# Patient Record
Sex: FEMALE | Race: WHITE | NOT HISPANIC OR LATINO | URBAN - METROPOLITAN AREA
[De-identification: names, ages, dates, MRNs, and addresses within clinical notes are randomized per-mention and may not be internally consistent; named-entity substitution may affect disease eponyms.]

---

## 2023-09-08 ENCOUNTER — INPATIENT (INPATIENT)
Facility: HOSPITAL | Age: 78
LOS: 2 days | Discharge: HOME HEALTH | DRG: 309 | End: 2023-09-11
Attending: HOSPITALIST | Admitting: INTERNAL MEDICINE
Payer: MEDICARE

## 2023-09-08 VITALS
TEMPERATURE: 99 F | DIASTOLIC BLOOD PRESSURE: 68 MMHG | OXYGEN SATURATION: 98 % | RESPIRATION RATE: 29 BRPM | SYSTOLIC BLOOD PRESSURE: 105 MMHG | HEART RATE: 163 BPM

## 2023-09-08 DIAGNOSIS — I48.92 UNSPECIFIED ATRIAL FLUTTER: ICD-10-CM

## 2023-09-08 LAB
ALBUMIN SERPL ELPH-MCNC: 2.7 G/DL — LOW (ref 3.3–5)
ALP SERPL-CCNC: 146 U/L — HIGH (ref 40–120)
ALT FLD-CCNC: 17 U/L — SIGNIFICANT CHANGE UP (ref 12–78)
ANION GAP SERPL CALC-SCNC: 4 MMOL/L — LOW (ref 5–17)
ANISOCYTOSIS BLD QL: SIGNIFICANT CHANGE UP
APTT BLD: 26.3 SEC — SIGNIFICANT CHANGE UP (ref 24.5–35.6)
AST SERPL-CCNC: 14 U/L — LOW (ref 15–37)
BASOPHILS # BLD AUTO: 0 K/UL — SIGNIFICANT CHANGE UP (ref 0–0.2)
BASOPHILS NFR BLD AUTO: 0 % — SIGNIFICANT CHANGE UP (ref 0–2)
BILIRUB SERPL-MCNC: 0.5 MG/DL — SIGNIFICANT CHANGE UP (ref 0.2–1.2)
BUN SERPL-MCNC: 15 MG/DL — SIGNIFICANT CHANGE UP (ref 7–23)
CALCIUM SERPL-MCNC: 8.3 MG/DL — LOW (ref 8.5–10.1)
CHLORIDE SERPL-SCNC: 110 MMOL/L — HIGH (ref 96–108)
CO2 SERPL-SCNC: 27 MMOL/L — SIGNIFICANT CHANGE UP (ref 22–31)
CREAT SERPL-MCNC: 0.59 MG/DL — SIGNIFICANT CHANGE UP (ref 0.5–1.3)
DACRYOCYTES BLD QL SMEAR: SLIGHT — SIGNIFICANT CHANGE UP
EGFR: 92 ML/MIN/1.73M2 — SIGNIFICANT CHANGE UP
EOSINOPHIL # BLD AUTO: 0.04 K/UL — SIGNIFICANT CHANGE UP (ref 0–0.5)
EOSINOPHIL NFR BLD AUTO: 1 % — SIGNIFICANT CHANGE UP (ref 0–6)
GLUCOSE SERPL-MCNC: 125 MG/DL — HIGH (ref 70–99)
HCT VFR BLD CALC: 29 % — LOW (ref 34.5–45)
HGB BLD-MCNC: 9.7 G/DL — LOW (ref 11.5–15.5)
HYPOCHROMIA BLD QL: SIGNIFICANT CHANGE UP
INR BLD: 1.02 RATIO — SIGNIFICANT CHANGE UP (ref 0.85–1.18)
LYMPHOCYTES # BLD AUTO: 0.79 K/UL — LOW (ref 1–3.3)
LYMPHOCYTES # BLD AUTO: 19 % — SIGNIFICANT CHANGE UP (ref 13–44)
MAGNESIUM SERPL-MCNC: 2.1 MG/DL — SIGNIFICANT CHANGE UP (ref 1.6–2.6)
MANUAL SMEAR VERIFICATION: SIGNIFICANT CHANGE UP
MCHC RBC-ENTMCNC: 29.8 PG — SIGNIFICANT CHANGE UP (ref 27–34)
MCHC RBC-ENTMCNC: 33.4 GM/DL — SIGNIFICANT CHANGE UP (ref 32–36)
MCV RBC AUTO: 89 FL — SIGNIFICANT CHANGE UP (ref 80–100)
METAMYELOCYTES # FLD: 3 % — HIGH (ref 0–0)
MONOCYTES # BLD AUTO: 0.33 K/UL — SIGNIFICANT CHANGE UP (ref 0–0.9)
MONOCYTES NFR BLD AUTO: 8 % — SIGNIFICANT CHANGE UP (ref 2–14)
NEUTROPHILS # BLD AUTO: 2.76 K/UL — SIGNIFICANT CHANGE UP (ref 1.8–7.4)
NEUTROPHILS NFR BLD AUTO: 62 % — SIGNIFICANT CHANGE UP (ref 43–77)
NEUTS BAND # BLD: 4 % — SIGNIFICANT CHANGE UP (ref 0–8)
NRBC # BLD: 1 /100 — HIGH (ref 0–0)
NRBC # BLD: SIGNIFICANT CHANGE UP /100 WBCS (ref 0–0)
NT-PROBNP SERPL-SCNC: 2510 PG/ML — HIGH (ref 0–450)
OVALOCYTES BLD QL SMEAR: SLIGHT — SIGNIFICANT CHANGE UP
PLAT MORPH BLD: NORMAL — SIGNIFICANT CHANGE UP
PLATELET # BLD AUTO: 93 K/UL — LOW (ref 150–400)
POIKILOCYTOSIS BLD QL AUTO: SLIGHT — SIGNIFICANT CHANGE UP
POTASSIUM SERPL-MCNC: 3.5 MMOL/L — SIGNIFICANT CHANGE UP (ref 3.5–5.3)
POTASSIUM SERPL-SCNC: 3.5 MMOL/L — SIGNIFICANT CHANGE UP (ref 3.5–5.3)
PROT SERPL-MCNC: 6.1 GM/DL — SIGNIFICANT CHANGE UP (ref 6–8.3)
PROTHROM AB SERPL-ACNC: 11.5 SEC — SIGNIFICANT CHANGE UP (ref 9.5–13)
RBC # BLD: 3.26 M/UL — LOW (ref 3.8–5.2)
RBC # FLD: 24.2 % — HIGH (ref 10.3–14.5)
RBC BLD AUTO: ABNORMAL
SCHISTOCYTES BLD QL AUTO: SLIGHT — SIGNIFICANT CHANGE UP
SODIUM SERPL-SCNC: 141 MMOL/L — SIGNIFICANT CHANGE UP (ref 135–145)
T4 FREE SERPL-MCNC: 1.13 NG/DL — SIGNIFICANT CHANGE UP (ref 0.76–1.46)
TARGETS BLD QL SMEAR: SLIGHT — SIGNIFICANT CHANGE UP
TROPONIN I, HIGH SENSITIVITY RESULT: 17.37 NG/L — SIGNIFICANT CHANGE UP
TSH SERPL-MCNC: 0.12 UU/ML — LOW (ref 0.34–4.82)
TSH SERPL-MCNC: 0.13 UU/ML — LOW (ref 0.34–4.82)
VARIANT LYMPHS # BLD: 3 % — SIGNIFICANT CHANGE UP (ref 0–6)
WBC # BLD: 4.18 K/UL — SIGNIFICANT CHANGE UP (ref 3.8–10.5)
WBC # FLD AUTO: 4.18 K/UL — SIGNIFICANT CHANGE UP (ref 3.8–10.5)

## 2023-09-08 PROCEDURE — 80048 BASIC METABOLIC PNL TOTAL CA: CPT

## 2023-09-08 PROCEDURE — 84100 ASSAY OF PHOSPHORUS: CPT

## 2023-09-08 PROCEDURE — 84436 ASSAY OF TOTAL THYROXINE: CPT

## 2023-09-08 PROCEDURE — 71045 X-RAY EXAM CHEST 1 VIEW: CPT | Mod: 26

## 2023-09-08 PROCEDURE — 83735 ASSAY OF MAGNESIUM: CPT

## 2023-09-08 PROCEDURE — 84439 ASSAY OF FREE THYROXINE: CPT

## 2023-09-08 PROCEDURE — 84443 ASSAY THYROID STIM HORMONE: CPT

## 2023-09-08 PROCEDURE — 99291 CRITICAL CARE FIRST HOUR: CPT

## 2023-09-08 PROCEDURE — 85027 COMPLETE CBC AUTOMATED: CPT

## 2023-09-08 PROCEDURE — 83036 HEMOGLOBIN GLYCOSYLATED A1C: CPT

## 2023-09-08 PROCEDURE — 36415 COLL VENOUS BLD VENIPUNCTURE: CPT

## 2023-09-08 PROCEDURE — 93010 ELECTROCARDIOGRAM REPORT: CPT

## 2023-09-08 PROCEDURE — 93306 TTE W/DOPPLER COMPLETE: CPT

## 2023-09-08 PROCEDURE — 84484 ASSAY OF TROPONIN QUANT: CPT

## 2023-09-08 PROCEDURE — 84480 ASSAY TRIIODOTHYRONINE (T3): CPT

## 2023-09-08 RX ORDER — DILTIAZEM HCL 120 MG
30 CAPSULE, EXT RELEASE 24 HR ORAL ONCE
Refills: 0 | Status: COMPLETED | OUTPATIENT
Start: 2023-09-08 | End: 2023-09-08

## 2023-09-08 RX ORDER — DILTIAZEM HCL 120 MG
30 CAPSULE, EXT RELEASE 24 HR ORAL EVERY 6 HOURS
Refills: 0 | Status: DISCONTINUED | OUTPATIENT
Start: 2023-09-08 | End: 2023-09-09

## 2023-09-08 RX ORDER — ENOXAPARIN SODIUM 100 MG/ML
60 INJECTION SUBCUTANEOUS ONCE
Refills: 0 | Status: COMPLETED | OUTPATIENT
Start: 2023-09-08 | End: 2023-09-09

## 2023-09-08 RX ORDER — DILTIAZEM HCL 120 MG
10 CAPSULE, EXT RELEASE 24 HR ORAL ONCE
Refills: 0 | Status: COMPLETED | OUTPATIENT
Start: 2023-09-08 | End: 2023-09-08

## 2023-09-08 RX ORDER — SODIUM CHLORIDE 9 MG/ML
500 INJECTION INTRAMUSCULAR; INTRAVENOUS; SUBCUTANEOUS ONCE
Refills: 0 | Status: COMPLETED | OUTPATIENT
Start: 2023-09-08 | End: 2023-09-08

## 2023-09-08 RX ORDER — DIGOXIN 250 MCG
125 TABLET ORAL DAILY
Refills: 0 | Status: DISCONTINUED | OUTPATIENT
Start: 2023-09-08 | End: 2023-09-11

## 2023-09-08 RX ORDER — ACETAMINOPHEN 500 MG
650 TABLET ORAL ONCE
Refills: 0 | Status: DISCONTINUED | OUTPATIENT
Start: 2023-09-08 | End: 2023-09-11

## 2023-09-08 RX ORDER — DIGOXIN 250 MCG
250 TABLET ORAL ONCE
Refills: 0 | Status: COMPLETED | OUTPATIENT
Start: 2023-09-08 | End: 2023-09-08

## 2023-09-08 RX ADMIN — Medication 30 MILLIGRAM(S): at 20:59

## 2023-09-08 RX ADMIN — SODIUM CHLORIDE 500 MILLILITER(S): 9 INJECTION INTRAMUSCULAR; INTRAVENOUS; SUBCUTANEOUS at 14:45

## 2023-09-08 RX ADMIN — Medication 30 MILLIGRAM(S): at 14:54

## 2023-09-08 RX ADMIN — Medication 10 MILLIGRAM(S): at 14:45

## 2023-09-08 RX ADMIN — Medication 10 MILLIGRAM(S): at 21:14

## 2023-09-08 RX ADMIN — Medication 250 MICROGRAM(S): at 15:09

## 2023-09-08 NOTE — ED PROVIDER NOTE - CLINICAL SUMMARY MEDICAL DECISION MAKING FREE TEXT BOX
76 y/o female with PMHx of lung cancer mets to brain, though no longer, last chemo 8/30, BIB private car via son from MD office after found to have +tachycardia at routine oncology appointment. Cardiologist office eval Dr. Essie ford. Patient denies chest pain, SOB, +general weakness, fatigue since last chemo. No fever. Cardiac monitor and +NCT.   Plan: EKG, CXR, labs including troponin, BNP, TSH, coags. Monitor, observe, reassess.   EKG +NCT ?SVT, but cardiac monitor reveals +rapid aflutter. Patient hypotensive 89, administering IV fluid prior to Cardizem consideration. Discussed with Essie cardiology.  Current blood pressure 89 systolic with normal neuro. Heart rate 145, rapid aflutter. Case discussed with Dr. Fountain, who advises 0.25mg Digoxin now. Agrees with IV fluid to better stabilize blood pressure. If systolic over 100, agrees with IV Cardizem; if not, another 0.25mg IV Digoxin in an hour. Dr Fountain will come in to see patient this afternoon. Expect admission. 76 y/o female with PMHx of lung cancer mets to brain, though no longer, last chemo 8/30, BIB private car via son from MD office after found to have +tachycardia at routine oncology appointment. Cardiologist office eval Dr. Fountain: rapid AFlutter. Patient denies chest pain, SOB, +general weakness, fatigue since last chemo. No fever. Cardiac monitor and +NCT.   Plan: EKG, CXR, labs including troponin, BNP, TSH, coags. Monitor, observe, reassess.   EKG +NCT ?SVT, but cardiac monitor reveals +rapid AFlutter. Patient hypotensive 89, administering IV fluid prior to Cardizem consideration. Discussed with Dr. Fountain Cardiology.  Current blood pressure 89 systolic with normal neuro. Heart rate 145, rapid AFlutter. Case discussed with Dr. Fountain, who advises 0.25mg Digoxin now. Agrees with IV fluid to better stabilize blood pressure. If systolic over 100, agrees with IV Cardizem; if not, another 0.25mg IV Digoxin in an hour. Dr Fountain will come in to see patient this afternoon. Expect admission.    See Progress Noters for labs review, pt re-eval & disposition.

## 2023-09-08 NOTE — ED PROVIDER NOTE - CONSTITUTIONAL, MLM
normal... Elderly white female, no respiratory distress, nontoxic. Elderly white female, no respiratory distress, mildly ill-appearing but non-toxic.

## 2023-09-08 NOTE — ED PROVIDER NOTE - PROGRESS NOTE DETAILS
Vineet Iverson for Dr. Alfonso: Current blood pressure 89 systolic with normal neuro. Heart rate 145, rapid aflutter. Case discussed with Dr. Fountain, who advises 0.25mg now. Agrees with IV fluid to better stabilize blood pressure. If systolic over 100, agrees with IV Cardizem; if not, another 0.25mg didge IV in an hour. Dr Fountain will come in to see patient this afternoon. Expect admission. KIP Alfonso MD:  Dr. Gregorio aware of Tele admission.   - 115, pt w/o cp, SOB, BP stable. KIP Alfonso MD:  Admit hospitalist called for Tele admission, phone message left.  Labs notable for normal Troponin, BNP ~ 2500 with small R pl. effusion by wet read. Vineet Iverson for Dr. Alfonso: Current blood pressure 89 systolic with normal neuro. Heart rate 145, rapid AFlutter. Case discussed with Dr. Fountain, who advises IV Digoxin 0.25mg now. Agrees with IV fluid to better stabilize blood pressure. If systolic over 100, agrees with IV Cardizem; if not, another 0.25mg Digoxin IV in an hour. Dr Fountain will come in to see patient this afternoon. Expect admission. KIP Alfonso MD:  Dr. Gregorio aware of Tele admission.   - 115 s/p Cardizem IV/po & IV Digoxin, pt w/o cp, SOB, BP stable.

## 2023-09-08 NOTE — ED ADULT NURSE NOTE - NSICDXPASTMEDICALHX_GEN_ALL_CORE_FT
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

## 2023-09-08 NOTE — CONSULT NOTE ADULT - ASSESSMENT
Atrial Flutter with RVR  Now cardioverted to NSR with dilt and Dig. Already on lopressor.  Alec Vasc score elevated.    Plan : Start AC with Eliquis (2.5 MG BID)  Continue digoxin 0.125 MG QD  Start Diltazem 30 MG Q 6 h and Change over o 120 CD daily.  Continue Lopressor as does have hyperthyroidism  Echo to r/o pericardial effusion  Repeat TFT's  Consult Oncology for fu.    D/w Staff, patient and family and Dr. Alfonso in ER.

## 2023-09-08 NOTE — ED PROVIDER NOTE - OBJECTIVE STATEMENT
76 y/o female with PMHx of non-small cell lung cancer metastatic to brain, last chemotherapy treatment 8/30, presents to the ED SIB Dr. Fountain for aflutter to 170s at [facility] office. Denies nausea/vomiting, abdominal pain, fever, cough, SOB, chest pain, palpitations, lightheadedness. 76 y/o female with PMHx of non-small cell lung cancer metastatic to brain, last chemotherapy treatment 8/30, BIB pvt car to the ED from Cardiology office/ Dr. Fountain for rapid AFlutter to 170s. Denies nausea/vomiting, abdominal pain, fever, cough, SOB, chest pain, palpitations, lightheadedness.  No h/o AFib nor AFlutter.

## 2023-09-08 NOTE — ED PROVIDER NOTE - NEUROLOGICAL, MLM
Spontaneous, unlabored and symmetrical Alert and oriented x3, cranial nerves intact, normal speech. no focal deficits, no motor or sensory deficits.

## 2023-09-08 NOTE — ED PROVIDER NOTE - CARE PLAN
1 Principal Discharge DX:	Atrial flutter with rapid ventricular response  Secondary Diagnosis:	New onset atrial flutter  Secondary Diagnosis:	Lung cancer

## 2023-09-08 NOTE — ED ADULT TRIAGE NOTE - CHIEF COMPLAINT QUOTE
Pt presents to ED sent by MD Fountain for rapid a flutter with . Pt continues to have HR in 170s in triage. Denies palpitations, chest pain, shortness of breath at this time. Sent to trauma .

## 2023-09-08 NOTE — ED ADULT NURSE NOTE - OBJECTIVE STATEMENT
PT presents to ED sent from cardiologist for SVT in 160s. PT was at follow up appointment when tachy was discovered without symptoms. PT has no complaints, expresses strong desire to go home due to lack of symptoms. PT has hx of brain and lung cancer, last chemo treatment on 8/30. PT skin color appropriate for race, respirations even and unlabored. ED workup in progress, medicated as per orders. PT under close observation in trauma room. Medicated as per orders. Will continue to monitor. Antione Castaneda RN

## 2023-09-09 LAB
A1C WITH ESTIMATED AVERAGE GLUCOSE RESULT: 5.7 % — HIGH (ref 4–5.6)
ADD ON TEST-SPECIMEN IN LAB: SIGNIFICANT CHANGE UP
ANION GAP SERPL CALC-SCNC: 6 MMOL/L — SIGNIFICANT CHANGE UP (ref 5–17)
BUN SERPL-MCNC: 11 MG/DL — SIGNIFICANT CHANGE UP (ref 7–23)
CALCIUM SERPL-MCNC: 8.3 MG/DL — LOW (ref 8.5–10.1)
CHLORIDE SERPL-SCNC: 114 MMOL/L — HIGH (ref 96–108)
CO2 SERPL-SCNC: 25 MMOL/L — SIGNIFICANT CHANGE UP (ref 22–31)
CREAT SERPL-MCNC: 0.67 MG/DL — SIGNIFICANT CHANGE UP (ref 0.5–1.3)
EGFR: 89 ML/MIN/1.73M2 — SIGNIFICANT CHANGE UP
ESTIMATED AVERAGE GLUCOSE: 117 MG/DL — HIGH (ref 68–114)
GLUCOSE SERPL-MCNC: 164 MG/DL — HIGH (ref 70–99)
HCT VFR BLD CALC: 27.4 % — LOW (ref 34.5–45)
HGB BLD-MCNC: 9.2 G/DL — LOW (ref 11.5–15.5)
MAGNESIUM SERPL-MCNC: 2 MG/DL — SIGNIFICANT CHANGE UP (ref 1.6–2.6)
MCHC RBC-ENTMCNC: 29.8 PG — SIGNIFICANT CHANGE UP (ref 27–34)
MCHC RBC-ENTMCNC: 33.6 GM/DL — SIGNIFICANT CHANGE UP (ref 32–36)
MCV RBC AUTO: 88.7 FL — SIGNIFICANT CHANGE UP (ref 80–100)
PHOSPHATE SERPL-MCNC: 2 MG/DL — LOW (ref 2.5–4.5)
PLATELET # BLD AUTO: 117 K/UL — LOW (ref 150–400)
POTASSIUM SERPL-MCNC: 3.4 MMOL/L — LOW (ref 3.5–5.3)
POTASSIUM SERPL-SCNC: 3.4 MMOL/L — LOW (ref 3.5–5.3)
RBC # BLD: 3.09 M/UL — LOW (ref 3.8–5.2)
RBC # FLD: 24.5 % — HIGH (ref 10.3–14.5)
SODIUM SERPL-SCNC: 145 MMOL/L — SIGNIFICANT CHANGE UP (ref 135–145)
T3 SERPL-MCNC: 105 NG/DL — SIGNIFICANT CHANGE UP (ref 80–200)
T4 AB SER-ACNC: 7.1 UG/DL — SIGNIFICANT CHANGE UP (ref 4.6–12)
TROPONIN I, HIGH SENSITIVITY RESULT: 13.96 NG/L — SIGNIFICANT CHANGE UP
WBC # BLD: 5.21 K/UL — SIGNIFICANT CHANGE UP (ref 3.8–10.5)
WBC # FLD AUTO: 5.21 K/UL — SIGNIFICANT CHANGE UP (ref 3.8–10.5)

## 2023-09-09 PROCEDURE — 99222 1ST HOSP IP/OBS MODERATE 55: CPT

## 2023-09-09 PROCEDURE — 99497 ADVNCD CARE PLAN 30 MIN: CPT | Mod: 25

## 2023-09-09 PROCEDURE — 12345: CPT | Mod: NC

## 2023-09-09 PROCEDURE — 93306 TTE W/DOPPLER COMPLETE: CPT | Mod: 26

## 2023-09-09 RX ORDER — METOPROLOL TARTRATE 50 MG
2.5 TABLET ORAL ONCE
Refills: 0 | Status: DISCONTINUED | OUTPATIENT
Start: 2023-09-09 | End: 2023-09-09

## 2023-09-09 RX ORDER — METOPROLOL TARTRATE 50 MG
25 TABLET ORAL
Refills: 0 | Status: DISCONTINUED | OUTPATIENT
Start: 2023-09-09 | End: 2023-09-10

## 2023-09-09 RX ORDER — SODIUM,POTASSIUM PHOSPHATES 278-250MG
1 POWDER IN PACKET (EA) ORAL
Refills: 0 | Status: COMPLETED | OUTPATIENT
Start: 2023-09-09 | End: 2023-09-10

## 2023-09-09 RX ORDER — DILTIAZEM HCL 120 MG
120 CAPSULE, EXT RELEASE 24 HR ORAL DAILY
Refills: 0 | Status: DISCONTINUED | OUTPATIENT
Start: 2023-09-09 | End: 2023-09-10

## 2023-09-09 RX ORDER — LANOLIN ALCOHOL/MO/W.PET/CERES
3 CREAM (GRAM) TOPICAL AT BEDTIME
Refills: 0 | Status: DISCONTINUED | OUTPATIENT
Start: 2023-09-09 | End: 2023-09-11

## 2023-09-09 RX ORDER — POTASSIUM CHLORIDE 20 MEQ
20 PACKET (EA) ORAL ONCE
Refills: 0 | Status: COMPLETED | OUTPATIENT
Start: 2023-09-09 | End: 2023-09-09

## 2023-09-09 RX ORDER — ONDANSETRON 8 MG/1
4 TABLET, FILM COATED ORAL EVERY 8 HOURS
Refills: 0 | Status: DISCONTINUED | OUTPATIENT
Start: 2023-09-09 | End: 2023-09-11

## 2023-09-09 RX ORDER — APIXABAN 2.5 MG/1
2.5 TABLET, FILM COATED ORAL
Refills: 0 | Status: DISCONTINUED | OUTPATIENT
Start: 2023-09-09 | End: 2023-09-11

## 2023-09-09 RX ORDER — METOPROLOL TARTRATE 50 MG
2.5 TABLET ORAL EVERY 6 HOURS
Refills: 0 | Status: DISCONTINUED | OUTPATIENT
Start: 2023-09-09 | End: 2023-09-11

## 2023-09-09 RX ORDER — METOPROLOL TARTRATE 50 MG
12.5 TABLET ORAL
Refills: 0 | Status: DISCONTINUED | OUTPATIENT
Start: 2023-09-09 | End: 2023-09-09

## 2023-09-09 RX ADMIN — APIXABAN 2.5 MILLIGRAM(S): 2.5 TABLET, FILM COATED ORAL at 09:34

## 2023-09-09 RX ADMIN — Medication 25 MILLIGRAM(S): at 10:19

## 2023-09-09 RX ADMIN — Medication 12.5 MILLIGRAM(S): at 09:34

## 2023-09-09 RX ADMIN — Medication 30 MILLIGRAM(S): at 00:09

## 2023-09-09 RX ADMIN — Medication 125 MICROGRAM(S): at 05:09

## 2023-09-09 RX ADMIN — Medication 1 PACKET(S): at 17:58

## 2023-09-09 RX ADMIN — ENOXAPARIN SODIUM 60 MILLIGRAM(S): 100 INJECTION SUBCUTANEOUS at 00:10

## 2023-09-09 RX ADMIN — Medication 20 MILLIEQUIVALENT(S): at 15:08

## 2023-09-09 RX ADMIN — APIXABAN 2.5 MILLIGRAM(S): 2.5 TABLET, FILM COATED ORAL at 21:09

## 2023-09-09 RX ADMIN — Medication 120 MILLIGRAM(S): at 12:55

## 2023-09-09 RX ADMIN — Medication 30 MILLIGRAM(S): at 05:09

## 2023-09-09 NOTE — PATIENT PROFILE ADULT - HISTORY OF COVID-19 VACCINATION
Next appt 2/23/17  Last appt 11/23/16    Refill request for  percocet  Last refilled info;  12/27/16 #84  Refill unable to be completed per standing protocol due to; controlled  Orders pended, and routed to provider for approval.     Vaccine status unknown

## 2023-09-09 NOTE — CONSULT NOTE ADULT - ASSESSMENT
78 yo female with his 76 yo female with history of SCLC presently on Carbo+ -16 and atezo, now admitted for afib with RVR     SCLC   - patient has been tolerating chemo quite well   - now s/p 4 cycles of chemo   - will plan to transition to maintenance I/O   - due for outpatient PET soon to assess response   - Patient follows with Dr. Driver      Afib with RVR   - now presently being rate controlled by cards   - continue with tele

## 2023-09-09 NOTE — H&P ADULT - NSHPPHYSICALEXAM_GEN_ALL_CORE
T(C): 36.7 (09-08-23 @ 21:45), Max: 37.2 (09-08-23 @ 14:11)  HR: 96 (09-09-23 @ 05:05) (80 - 163)  BP: 139/71 (09-09-23 @ 05:05) (105/68 - 147/82)  RR: 18 (09-09-23 @ 05:05) (18 - 29)  SpO2: 99% (09-09-23 @ 05:05) (98% - 99%)    CONSTITUTIONAL: Well groomed, no apparent distress  EYES: PERRLA and symmetric, EOMI, No conjunctival or scleral injection, non-icteric  ENMT: Oral mucosa with moist membranes. Normal dentition; no pharyngeal injection or exudates             NECK: Supple, symmetric and without tracheal deviation   RESP: No respiratory distress, no use of accessory muscles; CTA b/l, no WRR  CV: irregularly irregular, +S1S2, no MRG; no JVD; no peripheral edema  GI: Soft, NT, ND, no rebound, no guarding; no palpable masses;   LYMPH: No cervical LAD or tenderness;   MSK: Normal gait; Normal ROM without pain, normal muscle strength/tone  SKIN: No rashes or ulcers noted;   NEURO: CN II-XII intact; normal reflexes in upper and lower extremities, sensation intact in upper and lower extremities b/l to light touch   PSYCH: Appropriate insight/judgment; A+O x 3, mood and affect appropriate, recent/remote memory intact

## 2023-09-09 NOTE — PATIENT PROFILE ADULT - FALL HARM RISK - UNIVERSAL INTERVENTIONS
Bed in lowest position, wheels locked, appropriate side rails in place/Call bell, personal items and telephone in reach/Instruct patient to call for assistance before getting out of bed or chair/Non-slip footwear when patient is out of bed/[place] to call system/Physically safe environment - no spills, clutter or unnecessary equipment/Purposeful Proactive Rounding/Room/bathroom lighting operational, light cord in reach

## 2023-09-09 NOTE — H&P ADULT - ASSESSMENT
A/P:    1.  A flutter with Rapid ventricular response  -started on PO cardizem and Digoxin  -started on Eliquis  -follow clinically  -follow Echo  -follow cardiology consult    2.  Eliquis for DVT ppx    3.  Code status: Full code.

## 2023-09-09 NOTE — H&P ADULT - HISTORY OF PRESENT ILLNESS
76 y/o Female with PMHx colon polyps, RLL mass, tachycardia, right adrenal mass, recently diagnosed small cell lung cancer on carbo/etoposide/atezolizumab started on 6/26/23 and also received radiation for this who Came to [place] yesterday for chemo treatment and was found to be in rapid atrial flutter with marginal blood pressure.  BP in office was 90-95 systolic.  Sent to ER for evaluation/treatment and admission.  Denies chest pain, shortness of breath at rest, orthopnea, paroxysmal nocturnal dyspnea, claudication, pre-syncope or syncope. Denies palpitations or rapid heart beating.  No new complaints and BP better after IVF.

## 2023-09-10 LAB
ANION GAP SERPL CALC-SCNC: 4 MMOL/L — LOW (ref 5–17)
BUN SERPL-MCNC: 12 MG/DL — SIGNIFICANT CHANGE UP (ref 7–23)
CALCIUM SERPL-MCNC: 8.5 MG/DL — SIGNIFICANT CHANGE UP (ref 8.5–10.1)
CHLORIDE SERPL-SCNC: 116 MMOL/L — HIGH (ref 96–108)
CO2 SERPL-SCNC: 28 MMOL/L — SIGNIFICANT CHANGE UP (ref 22–31)
CREAT SERPL-MCNC: 0.54 MG/DL — SIGNIFICANT CHANGE UP (ref 0.5–1.3)
EGFR: 94 ML/MIN/1.73M2 — SIGNIFICANT CHANGE UP
GLUCOSE SERPL-MCNC: 93 MG/DL — SIGNIFICANT CHANGE UP (ref 70–99)
HCT VFR BLD CALC: 26.5 % — LOW (ref 34.5–45)
HGB BLD-MCNC: 8.7 G/DL — LOW (ref 11.5–15.5)
MAGNESIUM SERPL-MCNC: 2.1 MG/DL — SIGNIFICANT CHANGE UP (ref 1.6–2.6)
MCHC RBC-ENTMCNC: 29.4 PG — SIGNIFICANT CHANGE UP (ref 27–34)
MCHC RBC-ENTMCNC: 32.8 GM/DL — SIGNIFICANT CHANGE UP (ref 32–36)
MCV RBC AUTO: 89.5 FL — SIGNIFICANT CHANGE UP (ref 80–100)
PHOSPHATE SERPL-MCNC: 2.3 MG/DL — LOW (ref 2.5–4.5)
PLATELET # BLD AUTO: 151 K/UL — SIGNIFICANT CHANGE UP (ref 150–400)
POTASSIUM SERPL-MCNC: 3.7 MMOL/L — SIGNIFICANT CHANGE UP (ref 3.5–5.3)
POTASSIUM SERPL-SCNC: 3.7 MMOL/L — SIGNIFICANT CHANGE UP (ref 3.5–5.3)
RBC # BLD: 2.96 M/UL — LOW (ref 3.8–5.2)
RBC # FLD: 25 % — HIGH (ref 10.3–14.5)
SODIUM SERPL-SCNC: 148 MMOL/L — HIGH (ref 135–145)
TSH SERPL-MCNC: 0.16 UU/ML — LOW (ref 0.34–4.82)
WBC # BLD: 8.31 K/UL — SIGNIFICANT CHANGE UP (ref 3.8–10.5)
WBC # FLD AUTO: 8.31 K/UL — SIGNIFICANT CHANGE UP (ref 3.8–10.5)

## 2023-09-10 PROCEDURE — 99233 SBSQ HOSP IP/OBS HIGH 50: CPT

## 2023-09-10 RX ORDER — DILTIAZEM HCL 120 MG
180 CAPSULE, EXT RELEASE 24 HR ORAL DAILY
Refills: 0 | Status: DISCONTINUED | OUTPATIENT
Start: 2023-09-10 | End: 2023-09-11

## 2023-09-10 RX ORDER — METOPROLOL TARTRATE 50 MG
37.5 TABLET ORAL DAILY
Refills: 0 | Status: DISCONTINUED | OUTPATIENT
Start: 2023-09-10 | End: 2023-09-11

## 2023-09-10 RX ORDER — SODIUM CHLORIDE 9 MG/ML
1000 INJECTION, SOLUTION INTRAVENOUS
Refills: 0 | Status: DISCONTINUED | OUTPATIENT
Start: 2023-09-10 | End: 2023-09-11

## 2023-09-10 RX ADMIN — Medication 125 MICROGRAM(S): at 06:03

## 2023-09-10 RX ADMIN — APIXABAN 2.5 MILLIGRAM(S): 2.5 TABLET, FILM COATED ORAL at 10:31

## 2023-09-10 RX ADMIN — Medication 1 PACKET(S): at 17:16

## 2023-09-10 RX ADMIN — Medication 1 PACKET(S): at 11:40

## 2023-09-10 RX ADMIN — APIXABAN 2.5 MILLIGRAM(S): 2.5 TABLET, FILM COATED ORAL at 21:02

## 2023-09-10 RX ADMIN — SODIUM CHLORIDE 75 MILLILITER(S): 9 INJECTION, SOLUTION INTRAVENOUS at 11:41

## 2023-09-10 RX ADMIN — Medication 180 MILLIGRAM(S): at 10:31

## 2023-09-10 RX ADMIN — Medication 37.5 MILLIGRAM(S): at 10:31

## 2023-09-10 RX ADMIN — Medication 1 PACKET(S): at 10:14

## 2023-09-10 NOTE — PROVIDER CONTACT NOTE (OTHER) - ACTION/TREATMENT ORDERED:
MD made aware, extra dose of metoprolol ordered. Will continue to monitor.
MD made aware, will adjust dose on medications.

## 2023-09-10 NOTE — PROVIDER CONTACT NOTE (OTHER) - ASSESSMENT
Patient resting in bed, patient feels her heart rate a little accelerated. VS: Pulse 130, /50 MAP 68, SpO2 98%
Pt asymptomatic, VSS, sitting at the edge of the bed.

## 2023-09-11 VITALS
SYSTOLIC BLOOD PRESSURE: 118 MMHG | RESPIRATION RATE: 18 BRPM | OXYGEN SATURATION: 94 % | TEMPERATURE: 98 F | HEART RATE: 85 BPM | DIASTOLIC BLOOD PRESSURE: 45 MMHG

## 2023-09-11 LAB
ANION GAP SERPL CALC-SCNC: 2 MMOL/L — LOW (ref 5–17)
BUN SERPL-MCNC: 13 MG/DL — SIGNIFICANT CHANGE UP (ref 7–23)
CALCIUM SERPL-MCNC: 8.2 MG/DL — LOW (ref 8.5–10.1)
CHLORIDE SERPL-SCNC: 113 MMOL/L — HIGH (ref 96–108)
CO2 SERPL-SCNC: 27 MMOL/L — SIGNIFICANT CHANGE UP (ref 22–31)
CREAT SERPL-MCNC: 0.57 MG/DL — SIGNIFICANT CHANGE UP (ref 0.5–1.3)
EGFR: 93 ML/MIN/1.73M2 — SIGNIFICANT CHANGE UP
GLUCOSE SERPL-MCNC: 99 MG/DL — SIGNIFICANT CHANGE UP (ref 70–99)
HCT VFR BLD CALC: 27.3 % — LOW (ref 34.5–45)
HGB BLD-MCNC: 8.9 G/DL — LOW (ref 11.5–15.5)
MAGNESIUM SERPL-MCNC: 2.3 MG/DL — SIGNIFICANT CHANGE UP (ref 1.6–2.6)
MCHC RBC-ENTMCNC: 29.8 PG — SIGNIFICANT CHANGE UP (ref 27–34)
MCHC RBC-ENTMCNC: 32.6 GM/DL — SIGNIFICANT CHANGE UP (ref 32–36)
MCV RBC AUTO: 91.3 FL — SIGNIFICANT CHANGE UP (ref 80–100)
PHOSPHATE SERPL-MCNC: 2.7 MG/DL — SIGNIFICANT CHANGE UP (ref 2.5–4.5)
PLATELET # BLD AUTO: 207 K/UL — SIGNIFICANT CHANGE UP (ref 150–400)
POTASSIUM SERPL-MCNC: 3.9 MMOL/L — SIGNIFICANT CHANGE UP (ref 3.5–5.3)
POTASSIUM SERPL-SCNC: 3.9 MMOL/L — SIGNIFICANT CHANGE UP (ref 3.5–5.3)
RBC # BLD: 2.99 M/UL — LOW (ref 3.8–5.2)
RBC # FLD: 25.3 % — HIGH (ref 10.3–14.5)
SODIUM SERPL-SCNC: 142 MMOL/L — SIGNIFICANT CHANGE UP (ref 135–145)
WBC # BLD: 9.06 K/UL — SIGNIFICANT CHANGE UP (ref 3.8–10.5)
WBC # FLD AUTO: 9.06 K/UL — SIGNIFICANT CHANGE UP (ref 3.8–10.5)

## 2023-09-11 PROCEDURE — 99239 HOSP IP/OBS DSCHRG MGMT >30: CPT

## 2023-09-11 RX ORDER — METOPROLOL TARTRATE 50 MG
0.5 TABLET ORAL
Refills: 0 | DISCHARGE

## 2023-09-11 RX ORDER — APIXABAN 2.5 MG/1
1 TABLET, FILM COATED ORAL
Qty: 60 | Refills: 0
Start: 2023-09-11 | End: 2023-10-10

## 2023-09-11 RX ORDER — DILTIAZEM HCL 120 MG
1 CAPSULE, EXT RELEASE 24 HR ORAL
Qty: 30 | Refills: 0
Start: 2023-09-11 | End: 2023-10-10

## 2023-09-11 RX ORDER — METOPROLOL TARTRATE 50 MG
1.5 TABLET ORAL
Qty: 45 | Refills: 0
Start: 2023-09-11 | End: 2023-10-10

## 2023-09-11 RX ORDER — DIGOXIN 250 MCG
1 TABLET ORAL
Qty: 30 | Refills: 0
Start: 2023-09-11 | End: 2023-10-10

## 2023-09-11 RX ADMIN — APIXABAN 2.5 MILLIGRAM(S): 2.5 TABLET, FILM COATED ORAL at 09:13

## 2023-09-11 RX ADMIN — Medication 180 MILLIGRAM(S): at 09:13

## 2023-09-11 RX ADMIN — Medication 125 MICROGRAM(S): at 05:25

## 2023-09-11 RX ADMIN — Medication 37.5 MILLIGRAM(S): at 09:12

## 2023-09-11 NOTE — DISCHARGE NOTE PROVIDER - CARE PROVIDERS DIRECT ADDRESSES
[email or web address],DirectAddress_Unknown

## 2023-09-11 NOTE — PROGRESS NOTE ADULT - ASSESSMENT
Atrial Flutter with RVR  Now cardioverted to NSR with dilt and Dig. Already on lopressor.  Alec Vasc score elevated.    Plan : Start AC with Eliquis (2.5 MG BID)  Continue digoxin 0.125 MG QD  Start Diltazem 30 MG Q 6 h and Change over o 120 CD daily.  Continue Lopressor as does have hyperthyroidism  Echo to r/o pericardial effusion  Repeat TFT's  Consult Oncology for fu.      09/09/2023: Doing better.  In sinus rhythm on a combination of digoxin, diltiazem, and metoprolol.  Does have mild hyperthyroidism but is subclinical as her T3 and T4 within normal limits.  Right now medical therapy seems to be holding rhythm and therefore no therapy for hyperthyroidism is needed.  Oncology to consult.  continue on anticoagulation.  Possible discharge tomorrow.    D/w Staff, patient and family and Dr. Rodriguez.   
76 yo female with history of SCLC presently on Carbo+ -16 and atezo, now admitted for aflutter with RVR     SCLC   - patient has been tolerating chemo quite well   - now s/p 4 cycles of chemo   - will plan to transition to maintenance I/O   - due for outpatient PET soon to assess response   - Patient follows with Dr. Neisha fordwith RVR   - now presently being rate controlled by cards   - continue with tele  - now started on Dig, Diltiazem and BB and ELiquis   - no contraindication to eliquis   - will continue to follow    Neisha to return tomorrow
78 yo female with history of SCLC presently on Carbo+ -16 and atezo, now admitted for aflutter with RVR     SCLC   - extensive stage given mets to T7 s/p RT and brain   - patient has been tolerating chemo quite well - s/p 4 cycles of carbo/etoposide and atezolizumab  - will plan to transition to maintenance I/O after PET scan scheduled for tomorrow 9/12     aflutter with RVR   - occurred in office on Friday 9/8  - now presently being rate controlled by cardio- continue with tele  - TTE with nl EF  - now started on Dig, Diltiazem and BB and ELiquis   - no contraindication to eliquis   - will continue to follow    If HR stable today and cleared by cardio would dc home w/ plan for PET tomorrow and close f/u   
MEDICATIONS  (STANDING):  apixaban 2.5 milliGRAM(s) Oral two times a day  dextrose 5%. 1000 milliLiter(s) (75 mL/Hr) IV Continuous <Continuous>  digoxin     Tablet 125 MICROGram(s) Oral daily  diltiazem    milliGRAM(s) Oral daily  metoprolol succinate ER 37.5 milliGRAM(s) Oral daily  potassium phosphate / sodium phosphate Powder (PHOS-NaK) 1 Packet(s) Oral three times a day with meals    MEDICATIONS  (PRN):  acetaminophen     Tablet .. 650 milliGRAM(s) Oral once PRN Temp greater or equal to 38C (100.4F), Mild Pain (1 - 3)  aluminum hydroxide/magnesium hydroxide/simethicone Suspension 30 milliLiter(s) Oral every 4 hours PRN Dyspepsia  melatonin 3 milliGRAM(s) Oral at bedtime PRN Insomnia  metoprolol tartrate Injectable 2.5 milliGRAM(s) IV Push every 6 hours PRN HR greater than 125  ondansetron Injectable 4 milliGRAM(s) IV Push every 8 hours PRN Nausea and/or Vomiting      A/P:      New onset Paroxysmal A flutter with Rapid ventricular response  -started on PO cardizem and Digoxin and BB.   -Back in sinus 9/9->back in RVR 9/10->back in sinu afternoon of 9/10  -started on Eliquis  -TTE (9/9): EF 60-65% with Mild concentric LVH, Mild LA dilation, Mild AS/MR/TR  -Cardiology consult/recs->discussed with cardiology. If remains rate controlled and asymptomatic, tentative discharge 9/10  -Possible subclinical hyperthyroidism. TSH suppressed. Normal T3/T4. IN NSR and rate controlled now. Continue BB. outpatient follow up with PMD for recheck of TFTs in 1-3 weeks for continued evaluation    Metastatic Small Cell Lung Ca on Chemo  Assocaited anemia/thrombocytopenia  -Plt/Hgb relatively stable. no overt signs of bleeding.continue to monitor. outpatient follow up       Eliquis for DVT ppx      Code status: Full code. 
A/P:      New onset Paroxysmal A flutter with Rapid ventricular response  -started on PO cardizem and Digoxin and BB.   -Back in sinus 9/9  -started on Eliquis  -TTE  -Cardiology consult/recs->discussed with cardiology. If remains rate controlled and asymptomatic, tentative discharge 9/10  -TSH suppressed. Normal T3/T4. IN NSR and rate controlled now. Continue BB. outpatient follow up with PMD for recheck of TFTs in 1-3 weeks for continued evaluation    Metastatic Small Cell Lung Ca on Chemo  Assocaited anemia/thrombocytopenia  -Plt/Hgb relatively stable. no overt signs of bleeding.continue to monitor. outpatient follow up       Eliquis for DVT ppx      Code status: Full code. 
Atrial Flutter with RVR  Now cardioverted to NSR with dilt and Dig. Already on lopressor.  Alec Vasc score elevated.    Plan : Start AC with Eliquis (2.5 MG BID)  Continue digoxin 0.125 MG QD  Start Diltazem 30 MG Q 6 h and Change over o 120 CD daily.  Continue Lopressor as does have hyperthyroidism  Echo to r/o pericardial effusion  Repeat TFT's  Consult Oncology for fu.    09/09/2023: Doing better.  In sinus rhythm on a combination of digoxin, diltiazem, and metoprolol.  Does have mild hyperthyroidism but is subclinical as her T3 and T4 within normal limits.  Right now medical therapy seems to be holding rhythm and therefore no therapy for hyperthyroidism is needed.  Oncology to consult.  continue on anticoagulation.  Possible discharge tomorrow.    9/10/2023: Recurrent Afib.  Increase dilt and metoprolol.  Echo with good LV FX.  Continue AC.  FU matt.      D/w patient and staff and Dr. Rodriguez     D/w Staff, patient and family and Dr. Rodriguez.

## 2023-09-11 NOTE — DISCHARGE NOTE NURSING/CASE MANAGEMENT/SOCIAL WORK - NSPROEXTENSIONSOFSELF_GEN_A_NUR
R3 Note    Patient seen for evaluation due to new onset of contractions.  Patient reports on arrival feeling pelvic pressure, since admission she has started to feel distinct contractions that are painful.      Vital Signs Last 24 Hrs  T(C): 36.7 (21 Mar 2022 20:59), Max: 36.9 (21 Mar 2022 17:03)  T(F): 98.06 (21 Mar 2022 20:59), Max: 98.42 (21 Mar 2022 17:03)  HR: 71 (21 Mar 2022 21:17) (61 - 97)  BP: 105/58 (21 Mar 2022 20:54) (92/51 - 116/63)  BP(mean): --  RR: 18 (21 Mar 2022 18:39) (16 - 18)  SpO2: 99% (21 Mar 2022 21:17) (86% - 100%)    SVE: cerclage palpated in place, not on tension, cervix does not feel dilated around the stitch, cervix posterior w/ no tone to cervix  FHT: baseline 120, mod maryanne, +accels  [place]: q4-5 min    A/P: 35 yo  at 28.5 wks gestational age w/ hx of short cervix w/ rescue cerclage placed in second trimester, noted to have ballooning lower uterine segment in the office, referred to triage, ruled out for PPROM w/ new onset of ctx.  Ctx appreciated on toco w/o cervical change.    #PTL  -c/w magnesium for neuro protection  -c/w amp for GBS unknown  -continuous fetal monitoring  -NPO  -start indocin for tocolysis  -first dose of BMZ given  -NICU consult called    Pt seen w/ Dr. Morris  d/w GEORGINA on safety rounds  MGreenman PGY3 none

## 2023-09-11 NOTE — DISCHARGE NOTE PROVIDER - NSDCMRMEDTOKEN_GEN_ALL_CORE_FT
apixaban 2.5 mg oral tablet: 1 tab(s) orally every 12 hours  digoxin 125 mcg (0.125 mg) oral tablet: 1 tab(s) orally once a day  dilTIAZem 180 mg/24 hours oral capsule, extended release: 1 cap(s) orally once a day  metoprolol succinate 25 mg oral capsule, extended release: 1.5 cap(s) orally once a day

## 2023-09-11 NOTE — PHARMACOTHERAPY INTERVENTION NOTE - COMMENTS
Patient education provided for new start eliquis.  Purpose of the medication, how to take the medication and what to do in the event of a missed dose, common side effects and signs/ symptoms of internal bleeding were reviewed with the patient at bedside. Patient endorsed understanding and was informed the prescription will be ready at her [facility] for $47.
Medication history complete. Medications and allergies reviewed with patient's son, Daniel at bedside and confirmed with .

## 2023-09-11 NOTE — PROGRESS NOTE ADULT - REASON FOR ADMISSION
New Onset A flutter.

## 2023-09-11 NOTE — DISCHARGE NOTE PROVIDER - CARE PROVIDER_API CALL
John Fountain  Interventional Cardiology  [address] [place] [address], [place] [address]  Phone: [phone]  Fax: [phone]  Established Patient  Follow Up Time: 2 weeks    Primary Medical Doctor,   Phone: (   )    -  Fax: (   )    -  Established Patient  Follow Up Time: 1 week    Hematologist/Oncologist,   Phone: (   )    -  Fax: (   )    -  Established Patient  Follow Up Time: 1 week

## 2023-09-11 NOTE — DISCHARGE NOTE PROVIDER - PROVIDER TOKENS
PROVIDER:[TOKEN:[[id number]],FOLLOWUP:[2 weeks],ESTABLISHEDPATIENT:[T]],FREE:[LAST:[Primary Medical Doctor],PHONE:[(   )    -],FAX:[(   )    -],FOLLOWUP:[1 week],ESTABLISHEDPATIENT:[T]],FREE:[LAST:[Hematologist/Oncologist],PHONE:[(   )    -],FAX:[(   )    -],FOLLOWUP:[1 week],ESTABLISHEDPATIENT:[T]] Opt out

## 2023-09-11 NOTE — PROGRESS NOTE ADULT - SUBJECTIVE AND OBJECTIVE BOX
CHIEF COMPLAINT: Atrial Fibrillation/AFlutter with RVR (new)    SUBJECTIVE/SIGNIFICANT INTERVAL EVENTS/OVERNIGHT EVENTS:    : No complaints. Denies chest pain, dizziness, palpiations, nausea, vomiting, SOB. Afib RVR in am to 170s. Asytmpomatic. ->increaesd BB->back to NSR. Cardizem IR changed to CD    9/10: AFib RVR in [facility]. No symptoms except for maybe transient palpitations. Increased Cardizem and Toprol->back in sinus in the afternoon. Continue to monitor.     Review of Systems: 14 Point review of systems reviewed and reported as negative unless otherwise stated above    FROM H&P:  "76 y/o Female with PMHx colon polyps, RLL mass, tachycardia, right adrenal mass, recently diagnosed small cell lung cancer on carbo/etoposide/atezolizumab started on 23 and also received radiation for this who Came to [place] yesterday for chemo treatment and was found to be in rapid atrial flutter with marginal blood pressure.  BP in office was 90-95 systolic.  Sent to ER for evaluation/treatment and admission.  Denies chest pain, shortness of breath at rest, orthopnea, paroxysmal nocturnal dyspnea, claudication, pre-syncope or syncope. Denies palpitations or rapid heart beating.  No new complaints and BP better after IVF. "    PHYSICAL EXAM:    T(C): 36.4 (09-10-23 @ 08:00), Max: 36.8 (23 @ 20:29)  HR: 90 (09-10-23 @ 11:04) (78 - 130)  BP: 120/50 (09-10-23 @ 09:51) (103/29 - 120/50)  RR: 18 (09-10-23 @ 08:00) (18 - 18)  SpO2: 95% (09-10-23 @ 08:00) (93% - 95%)    General: AAOx3; NAD  Head: AT/NC  ENT: Moist Mucous Membranes; No Injury  Eyes: EOMI; PERRL  Neck: Non-tender; No JVD  CVS: RRR, S1&S2, No murmur, No edema  Respiratory: Lungs CTA B/L; Normal Respiratory Effort  Abdomen/GI: Soft, non-tender, non-distended, no guarding, no rebound, normal bowel sounds  : No bladder distention, No Quan  Extremities: No cyanosis, No clubbing, No edema  MSK: No CVA tenderness, Normal ROM, No injury  Neuro: AAOx3, CNII-XII grossly intact, non-focal  Psych: Appropriate, Cooperative,  Skin: Clean, Dry and Intact      LABS:                          8.7    8.31  )-----------( 151      ( 10 Sep 2023 06:46 )             26.5     09-10    148<H>  |  116<H>  |  12  ----------------------------<  93  3.7   |  28  |  0.54    Ca    8.5      10 Sep 2023 06:46  Phos  2.3     09-10  Mg     2.1     09-10        CAPILLARY BLOOD GLUCOSE                                9.2    5.21  )-----------( 117      ( 09 Sep 2023 10:24 )             27.4     09-09    145  |  114<H>  |  11  ----------------------------<  164<H>  3.4<L>   |  25  |  0.67    Ca    8.3<L>      09 Sep 2023 10:24  Phos  2.0       Mg     2.0         TPro  6.1  /  Alb  2.7<L>  /  TBili  0.5  /  DBili  x   /  AST  14<L>  /  ALT  17  /  AlkPhos  146<H>        CAPILLARY BLOOD GLUCOSE      Troponin I, High Sensitivity Result: 13.96:Thyroid Stimulating Hormone, Serum (23 @ 21:08)   Thyroid Stimulating Hormone, Serum: 0.13 uU/mLFree Thyroxine, Serum (23 @ 21:08)   Free Thyroxine, Serum: 1.13 ng/dLT4, Serum (23 @ 21:08)   T4, Serum: 7.1 ug/dLTriiodothyronine, Total (T3 Total) (23 @ 21:08)   Triiodothyronine, Total (T3 Total): 105 ng/dL        RADIOLOGY:  < from: Xray Chest 1 View- PORTABLE-Urgent (09.08.23 @ 15:09) >  IMPRESSION: COPD. Mild right base infiltrate with effusion. Right-sided   port.    < end of copied text >      EK/8: Atrial flutter with RVR    ECHO:  < from: TTE Echo Complete w/o Contrast w/ Doppler (23 @ 11:45) >   Summary     Estimated left ventricular ejection fraction is 60-65 %.   The left ventricle is normal in size, wall motion and contractility as   seen in limited views.   Mild concentric left ventricular hypertrophy is present.   The left atrium is mildly dilated.   Normal appearing right atrium. Prominent budd chairi network - normal   anatomical variant.   Mild aortic stenosis.   Mild mitral regurgitation.   Mild (1+) tricuspid valve regurgitation. Normal pulmonary artery systolic   pressures - 29 mmHg    < end of copied text >        I personally reviewed labs, imaging, ekg, orders and vitals.    Discussed case with:  [X]RN  [X]MICHELE/NENITA  [X]Patient  [X]Family  [X]Specialist: Cardiology        
CHIEF COMPLAINT: Atrial Fibrillation/AFlutter with RVR (new)    SUBJECTIVE/SIGNIFICANT INTERVAL EVENTS/OVERNIGHT EVENTS:    : No complaints. Denies chest pain, dizziness, palpiations, nausea, vomiting, SOB. Afib RVR in am to 170s. Asytmpomatic. ->increaesd BB->back to NSR. Cardizem IR changed to CD    Review of Systems: 14 Point review of systems reviewed and reported as negative unless otherwise stated above    FROM H&P:  "78 y/o Female with PMHx colon polyps, RLL mass, tachycardia, right adrenal mass, recently diagnosed small cell lung cancer on carbo/etoposide/atezolizumab started on 23 and also received radiation for this who Came to [place] yesterday for chemo treatment and was found to be in rapid atrial flutter with marginal blood pressure.  BP in office was 90-95 systolic.  Sent to ER for evaluation/treatment and admission.  Denies chest pain, shortness of breath at rest, orthopnea, paroxysmal nocturnal dyspnea, claudication, pre-syncope or syncope. Denies palpitations or rapid heart beating.  No new complaints and BP better after IVF. "    PHYSICAL EXAM:    T(C): 36.9 (23 @ 09:46), Max: 36.9 (23 @ 19:46)  HR: 102 (23 @ 09:46) (80 - 102)  BP: 113/46 (23 @ 09:46) (113/46 - 147/82)  RR: 18 (23 @ 09:46) (18 - 20)  SpO2: 96% (23 @ 09:46) (96% - 99%)    General: AAOx3; NAD  Head: AT/NC  ENT: Moist Mucous Membranes; No Injury  Eyes: EOMI; PERRL  Neck: Non-tender; No JVD  CVS: RRR, S1&S2, No murmur, No edema  Respiratory: Lungs CTA B/L; Normal Respiratory Effort  Abdomen/GI: Soft, non-tender, non-distended, no guarding, no rebound, normal bowel sounds  : No bladder distention, No Quan  Extremities: No cyanosis, No clubbing, No edema  MSK: No CVA tenderness, Normal ROM, No injury  Neuro: AAOx3, CNII-XII grossly intact, non-focal  Psych: Appropriate, Cooperative,  Skin: Clean, Dry and Intact      LABS:                          9.2    5.21  )-----------( 117      ( 09 Sep 2023 10:24 )             27.4         145  |  114<H>  |  11  ----------------------------<  164<H>  3.4<L>   |  25  |  0.67    Ca    8.3<L>      09 Sep 2023 10:24  Phos  2.0       Mg     2.0         TPro  6.1  /  Alb  2.7<L>  /  TBili  0.5  /  DBili  x   /  AST  14<L>  /  ALT  17  /  AlkPhos  146<H>        CAPILLARY BLOOD GLUCOSE      Troponin I, High Sensitivity Result: 13.96:Thyroid Stimulating Hormone, Serum (23 @ 21:08)   Thyroid Stimulating Hormone, Serum: 0.13 uU/mLFree Thyroxine, Serum (23 @ 21:08)   Free Thyroxine, Serum: 1.13 ng/dLT4, Serum (23 @ 21:08)   T4, Serum: 7.1 ug/dLTriiodothyronine, Total (T3 Total) (23 @ 21:08)   Triiodothyronine, Total (T3 Total): 105 ng/dL        RADIOLOGY:  < from: Xray Chest 1 View- PORTABLE-Urgent (23 @ 15:09) >  IMPRESSION: COPD. Mild right base infiltrate with effusion. Right-sided   port.    < end of copied text >      EK/8: Atrial flutter with RVR    ECHO:  Pending      I personally reviewed labs, imaging, ekg, orders and vitals.    Discussed case with:  [X]RN  [X]CM/SW  [X]Patient  [X]Family  [X]Specialist: Cardiology        MEDICATIONS  (STANDING):  apixaban 2.5 milliGRAM(s) Oral two times a day  digoxin     Tablet 125 MICROGram(s) Oral daily  diltiazem    milliGRAM(s) Oral daily  metoprolol succinate ER 25 milliGRAM(s) Oral two times a day    MEDICATIONS  (PRN):  acetaminophen     Tablet .. 650 milliGRAM(s) Oral once PRN Temp greater or equal to 38C (100.4F), Mild Pain (1 - 3)  aluminum hydroxide/magnesium hydroxide/simethicone Suspension 30 milliLiter(s) Oral every 4 hours PRN Dyspepsia  melatonin 3 milliGRAM(s) Oral at bedtime PRN Insomnia  metoprolol tartrate Injectable 2.5 milliGRAM(s) IV Push every 6 hours PRN HR greater than 125  ondansetron Injectable 4 milliGRAM(s) IV Push every 8 hours PRN Nausea and/or Vomiting    
78 y/o F with PMHx colon polyps, RLL mass, tachycardia, right adrenal mass, recently diagnosed small cell lung cancer on carbo/etoposide/atezolizumab started on 6/26/23 and also received radiation for this who Came to [place] today for chemo treatment and was found to be in rapid atrial flutter with marginal blood pressure.  Bp in office 90-95 systolic.  Sent to ER for evaluation/treatment and admission.  Denies chest pain, shortness of breath at rest, orthopnea, paroxysmal nocturnal dyspnea, claudication, pre-syncope or syncope. Denies palpitations or rapid heart beating.  Recent echo: 5/17/23 EF 55-60%, trivial pericardial effusion present. Now back in NSR in ER after IV diltizem, and digoxin. No new complaints and BP better after IVF. Oncologist is Dr. Domingo.  Endo: Dr. Clay.        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 LN station 4R and 11R atypical cells present, findings unable to completely exclude possibility of underlying small cell  carcinoma. BAL suspicious for malignant cells. Pancytokeratin (Positive), CD56 (Pos), INSMI (focally pos), Synaptophysin  (Pos), Thyroid transcription factor 1 (Pos), Ki67 (50%)   Study supports diagnosis of Small Cell Carcinoma    09/09/2023:  Feels better this a.m..  Preliminary review of echo shows no pericardial effusion.  Left ventricular function normal.  Await final report.  Currently in sinus rhythm now decide.    9/10/2023: Was in NSR overnight but the this Am went back into atrial fib.   No symptoms and complaints.     Past medical History  Cdiff, colon polyps, RLL mas, tachycardia, right adrenal mass    Past Surgical History  pshxappendectomy,  eye surgery, ovarian cyst surgery, sinus excision, tonsillectomy  Hyperthyroidism (no treatment as she is asymptomatic)      Social History  Patient is .  Patient lives with son/daughter.  Past tobacco smoker Not applicable Patient reports alcohol use as follows:  Denies any illicit drug use.  Patient has had no occupational exposure.    Family History  fhxfather  colon cancer      Personal History  Patient reports having routine colonoscopy. Last done:052023  Patient denies having ever received blood products in the past.    Allergies  NKDA    Home Medications:  Decadron  Lopressor      MEDICATIONS  (STANDING):  apixaban 2.5 milliGRAM(s) Oral two times a day  dextrose 5%. 1000 milliLiter(s) (75 mL/Hr) IV Continuous <Continuous>  digoxin     Tablet 125 MICROGram(s) Oral daily  diltiazem    milliGRAM(s) Oral daily  metoprolol succinate ER 37.5 milliGRAM(s) Oral daily  potassium phosphate / sodium phosphate Powder (PHOS-NaK) 1 Packet(s) Oral three times a day with meals    MEDICATIONS  (PRN):  acetaminophen     Tablet .. 650 milliGRAM(s) Oral once PRN Temp greater or equal to 38C (100.4F), Mild Pain (1 - 3)  aluminum hydroxide/magnesium hydroxide/simethicone Suspension 30 milliLiter(s) Oral every 4 hours PRN Dyspepsia  melatonin 3 milliGRAM(s) Oral at bedtime PRN Insomnia  metoprolol tartrate Injectable 2.5 milliGRAM(s) IV Push every 6 hours PRN HR greater than 125  ondansetron Injectable 4 milliGRAM(s) IV Push every 8 hours PRN Nausea and/or Vomiting      Vital Signs Last 24 Hrs  T(C): 36.4 (10 Sep 2023 08:00), Max: 36.8 (09 Sep 2023 20:29)  T(F): 97.6 (10 Sep 2023 08:00), Max: 98.2 (09 Sep 2023 20:29)  HR: 90 (10 Sep 2023 11:04) (78 - 130)  BP: 120/50 (10 Sep 2023 09:51) (103/29 - 120/50)  BP(mean): 68 (10 Sep 2023 09:51) (47 - 68)  RR: 18 (10 Sep 2023 08:00) (18 - 18)  SpO2: 95% (10 Sep 2023 08:00) (93% - 95%)    Parameters below as of 10 Sep 2023 08:00  Patient On (Oxygen Delivery Method): room air        I&O's Detail    09 Sep 2023 07:01  -  10 Sep 2023 07:00  --------------------------------------------------------  IN:  Total IN: 0 mL    OUT:    Stool (mL): 1 mL  Total OUT: 1 mL    Total NET: -1 mL      Physical Exam  NAD no acute distress. Not anxious.  No rash noted. No lesions. PT with raynauds  Sclera clear, no jaundice noted.  IRRegular rate and rhythm. S1, S2 , 1/6 RAFFI.  LUSB, No S3, No S4.    No dyspnea. Unlabored breathing. No accessory muscle use. No rhonchi. No wheezing.  Abdomen is soft and nontender to touch. No abdominal pain or guarding noted.  No petechiae noted. Purpura noted during exam. No lymphadenopathy noted.  Affect normal. No tremors observed.  Oriented to person/place/time. No anxiety observed during exam. No signs of depression noted during exam.  No calf tenderness reported by patient. No edema.    Laboratory values:                          8.7    8.31  )-----------( 151      ( 10 Sep 2023 06:46 )             26.5     09-10    148<H>  |  116<H>  |  12  ----------------------------<  93  3.7   |  28  |  0.54    Ca    8.5      10 Sep 2023 06:46  Phos  2.3     09-10  Mg     2.1     09-10    TPro  6.1  /  Alb  2.7<L>  /  TBili  0.5  /  DBili  x   /  AST  14<L>  /  ALT  17  /  AlkPhos  146<H>  09-08        - Troponin: <-13.96, <-17.37  LIVER FUNCTIONS - ( 08 Sep 2023 14:11 )  Alb: 2.7 g/dL / Pro: 6.1 gm/dL / ALK PHOS: 146 U/L / ALT: 17 U/L / AST: 14 U/L / GGT: x           PT/INR - ( 08 Sep 2023 14:11 )   PT: 11.5 sec;   INR: 1.02 ratio         PTT - ( 08 Sep 2023 14:11 )  PTT:26.3 sec    Pro-Brain Natriuretic Peptide: 2510 pg/mL (09.08.23 @ 14:11)    OFFICE LAB WORK      Component                                                           Latest Ref Rng & Units                   8/28/2023 9/8/2023              WHITE BLOOD CELL                                                    3.8 - 10.5 X10                           26.8 (HH)             3.0 (L)               RBC BLOOD CELL                                                      3.69 - 5.20 X10                          4.22                  2.97 (L)              Hemoglobin                                                          11.0 - 14.8 G/DL                         12.1                  8.5 (L)               HEMATOCRIT                                                          33.0 - 44.4 %                            37.4                  27.1 (L)              MEAN LIBRA. VOLUME                                                   80.0 - 98.0 FL                           88.0                  92.0                  MEAN LIBRA. HGB                                                      27.0 - 33.0 PG                           28.6                  28.7                  MEAN LIBRA. HGB CONC.                                                32.0 - 36.0 G/DL                         32.4                  31.3 (L)              RED DISTRIB. WIDTH                                                  10.5 - 14.3 %                            21.6 (H)              20.2 (H)              PLATELET                                                            140.0 - 375.0 X10                        200.0                 80.0 (L)                         GLUCOSE                                                             70.0 - 99.0 MG/DL                        117.0 (H)                                   UREA NITROGEN                                                       7.0 - 25.0 MG/DL                         17.7                                        Creatinine                                                          0.6 - 1.3 MG/DL                          0.6                                         EGFR NON- AM                                                 ML/MIN/1.7                               96.69                                       EGFR  AM                                                     ML/MIN/1.7                               96.69                                       CALCIUM                                                             8.6 - 10.3 MG/DL                         8.7                                         Protein Total, Serum                                                6.0 - 8.3 G/DL                           5.7 (L)                                     ALBUMIN, BLD                                                        3.7 - 5.3 G/DL                           3.6 (L)                                     AST (SGOT)                                                          0.0 - 39.0 U/L                           13.0                                        ALT(SGPT)                                                           0.0 - 52.0 U/L                           12.0                                        ALK PHOSPHATASE, BLD                                                34.0 - 104.0 U/L                         109.0 (H)                                   BILIRUBIN TOTAL                                                     0.3 - 1.3 MG/DL                          1.1                                         SODIUM-BLD                                                          135.0 - 145.0 MEQ/L                      142.0                                       POTASSIUM                                                           3.5 - 5.2 MEQ/L                          4.6                                         CHLORIDE-BLD                                                        98.0 - 109.0 MEQ/L                       105.0                                       CO2 TOTAL                                                           21.0 - 31.0 MEQ/L                        25.7                                        IRON BIND.CAP.(TIBC)                                                250 - 450 ug/dL                                                                      UIBC                                                                118 - 369 ug/dL                                                                      IRON, SERUM                                                         27 - 139 ug/dL                                                                       IRON SATURATION                                                     15 - 55 %                                                                            CEA                                                                 0.0 - 4.7 ng/mL                                                                      URIC ACID                                                           3.3 - 8.7 MG/DL                          5.9                                         PHOSPHORUS                                                          2.5 - 5.0 MG/DL                          3.7                                         LD(LDH)                                                             140.0 - 271.0 U/L                        281.0 (H)                                   CORTISOL, SERUM                                                     6.2 - 19.4 ug/dL                         3.4 (L)                                     TSH                                                                 0.450 - 4.500 uIU/mL                     0.057 (L)                                   MAGNESIUM                                                           1.6 - 2.3 mg/dL                          2.2                                         FERRITIN                                                            15 - 150 ng/mL                                                                       THYROXINE (T4)                                                      4.5 - 12.0 ug/dL                         6.0                                         RETICULOCYTE COUNT                                                  0.6 - 2.6 %                                                                          T4,FREE (DIRECT)                                                    0.82 - 1.77 ng/dL                                                                    TRIIODOTHYRONINE,FREE,SERUM                                         2.0 - 4.4 pg/mL                                                                      THYROID PEROXIDASE (TPO) AB                                         0 - 34 IU/mL                                                                         THYROGLOBULIN ANTIBODY                                              0.0 - 0.9 IU/mL                                                                      THYROTROPIN RECEPTOR AB, SERUM                                      0.00 - 1.75 IU/L                                                                         EKG: Aflutter with RVR.  150's, NSST changes.     Telemetry: Sinus rhythm. Afib/flutter this AM.      < from: TTE Echo Complete w/o Contrast w/ Doppler (09.09.23 @ 11:45) >   Estimated left ventricular ejection fraction is 60-65 %.   The left ventricle is normal in size, wall motion and contractility as   seen in limited views.   Mild concentric left ventricular hypertrophy is present.   The left atrium is mildly dilated.   Normal appearing right atrium. Prominent budd chairi network - normal   anatomical variant.   Mild aortic stenosis.   Mild mitral regurgitation.   Mild (1+) tricuspid valve regurgitation. Normal pulmonary artery systolic   pressures - 29 mmHg.    < end of copied text >  
76 y/o F with PMHx colon polyps, RLL mass, tachycardia, right adrenal mass, recently diagnosed small cell lung cancer on carbo/etoposide/atezolizumab started on 6/26/23 and also received radiation for this who Came to [place] today for chemo treatment and was found to be in rapid atrial flutter with marginal blood pressure.  Bp in office 90-95 systolic.  Sent to ER for evaluation/treatment and admission.  Denies chest pain, shortness of breath at rest, orthopnea, paroxysmal nocturnal dyspnea, claudication, pre-syncope or syncope. Denies palpitations or rapid heart beating.  Recent echo: 5/17/23 EF 55-60%, trivial pericardial effusion present. Now back in NSR in ER after IV diltizem, and digoxin. No new complaints and BP better after IVF. Oncologist is Dr. Domingo.  Endo: Dr. Clay.        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 LN station 4R and 11R atypical cells present, findings unable to completely exclude possibility of underlying small cell  carcinoma. BAL suspicious for malignant cells. Pancytokeratin (Positive), CD56 (Pos), INSMI (focally pos), Synaptophysin  (Pos), Thyroid transcription factor 1 (Pos), Ki67 (50%)   Study supports diagnosis of Small Cell Carcinoma    09/09/2023:  Feels better this a.m..  Preliminary review of echo shows no pericardial effusion.  Left ventricular function normal.  Await final report.  Currently in sinus rhythm now decide.      Past medical History  Cdiff, colon polyps, RLL mas, tachycardia, right adrenal mass    Past Surgical History  pshxappendectomy,  eye surgery, ovarian cyst surgery, sinus excision, tonsillectomy  Hyperthyroidism (no treatment as she is asymptomatic)      Social History  Patient is .  Patient lives with son/daughter.  Past tobacco smoker Not applicable Patient reports alcohol use as follows:  Denies any illicit drug use.  Patient has had no occupational exposure.    Family History  fhxfather  colon cancer      Personal History  Patient reports having routine colonoscopy. Last done:052023  Patient denies having ever received blood products in the past.    Allergies  NKDA    Home Medications:  Decadron  Lopressor    MEDICATIONS  (STANDING):  apixaban 2.5 milliGRAM(s) Oral two times a day  digoxin     Tablet 125 MICROGram(s) Oral daily  diltiazem    milliGRAM(s) Oral daily  metoprolol succinate ER 25 milliGRAM(s) Oral two times a day    MEDICATIONS  (PRN):  acetaminophen     Tablet .. 650 milliGRAM(s) Oral once PRN Temp greater or equal to 38C (100.4F), Mild Pain (1 - 3)  aluminum hydroxide/magnesium hydroxide/simethicone Suspension 30 milliLiter(s) Oral every 4 hours PRN Dyspepsia  melatonin 3 milliGRAM(s) Oral at bedtime PRN Insomnia  metoprolol tartrate Injectable 2.5 milliGRAM(s) IV Push every 6 hours PRN HR greater than 125  ondansetron Injectable 4 milliGRAM(s) IV Push every 8 hours PRN Nausea and/or Vomiting      Vital Signs Last 24 Hrs  T(C): 36.9 (09 Sep 2023 09:46), Max: 37.2 (08 Sep 2023 14:11)  T(F): 98.4 (09 Sep 2023 09:46), Max: 98.9 (08 Sep 2023 14:11)  HR: 102 (09 Sep 2023 09:46) (80 - 163)  BP: 113/46 (09 Sep 2023 09:46) (105/68 - 147/82)  BP(mean): 63 (09 Sep 2023 09:46) (63 - 81)  RR: 18 (09 Sep 2023 09:46) (18 - 29)  SpO2: 96% (09 Sep 2023 09:46) (96% - 99%)    Parameters below as of 09 Sep 2023 09:46  Patient On (Oxygen Delivery Method): room air        I&O's Detail      Daily     Daily     I&O's Detail    Physical Exam  NAD no acute distress. Not anxious.  No rash noted. No lesions. PT with raynauds  Sclera clear, no jaundice noted.    Regular rate and rhythm. S1, S2 , 1/6 RAFFI.  LUSB, No S3, No S4.    No dyspnea. Unlabored breathing. No accessory muscle use. No rhonchi. No wheezing.  Abdomen is soft and nontender to touch. No abdominal pain or guarding noted.  No petechiae noted. Purpura noted during exam. No lymphadenopathy noted.  Affect normal. No tremors observed.  Oriented to person/place/time. No anxiety observed during exam. No signs of depression noted during exam.  No calf tenderness reported by patient. No edema.        Laboratory values:                          9.2    5.21  )-----------( 117      ( 09 Sep 2023 10:24 )             27.4     09-09    145  |  114<H>  |  11  ----------------------------<  164<H>  3.4<L>   |  25  |  0.67    Ca    8.3<L>      09 Sep 2023 10:24  Phos  2.0     09-09  Mg     2.0     09-09    TPro  6.1  /  Alb  2.7<L>  /  TBili  0.5  /  DBili  x   /  AST  14<L>  /  ALT  17  /  AlkPhos  146<H>  09-08        - Troponin: <-13.96, <-17.37  LIVER FUNCTIONS - ( 08 Sep 2023 14:11 )  Alb: 2.7 g/dL / Pro: 6.1 gm/dL / ALK PHOS: 146 U/L / ALT: 17 U/L / AST: 14 U/L / GGT: x           PT/INR - ( 08 Sep 2023 14:11 )   PT: 11.5 sec;   INR: 1.02 ratio         PTT - ( 08 Sep 2023 14:11 )  PTT:26.3 sec            Pro-Brain Natriuretic Peptide: 2510 pg/mL (09.08.23 @ 14:11)    OFFICE LAB WORK      Component                                                           Latest Ref Rng & Units                   8/28/2023 9/8/2023              WHITE BLOOD CELL                                                    3.8 - 10.5 X10                           26.8 (HH)             3.0 (L)               RBC BLOOD CELL                                                      3.69 - 5.20 X10                          4.22                  2.97 (L)              Hemoglobin                                                          11.0 - 14.8 G/DL                         12.1                  8.5 (L)               HEMATOCRIT                                                          33.0 - 44.4 %                            37.4                  27.1 (L)              MEAN LIBRA. VOLUME                                                   80.0 - 98.0 FL                           88.0                  92.0                  MEAN LIBRA. HGB                                                      27.0 - 33.0 PG                           28.6                  28.7                  MEAN LIBRA. HGB CONC.                                                32.0 - 36.0 G/DL                         32.4                  31.3 (L)              RED DISTRIB. WIDTH                                                  10.5 - 14.3 %                            21.6 (H)              20.2 (H)              PLATELET                                                            140.0 - 375.0 X10                        200.0                 80.0 (L)                         GLUCOSE                                                             70.0 - 99.0 MG/DL                        117.0 (H)                                   UREA NITROGEN                                                       7.0 - 25.0 MG/DL                         17.7                                        Creatinine                                                          0.6 - 1.3 MG/DL                          0.6                                         EGFR NON- AM                                                 ML/MIN/1.7                               96.69                                       EGFR  AM                                                     ML/MIN/1.7                               96.69                                       CALCIUM                                                             8.6 - 10.3 MG/DL                         8.7                                         Protein Total, Serum                                                6.0 - 8.3 G/DL                           5.7 (L)                                     ALBUMIN, BLD                                                        3.7 - 5.3 G/DL                           3.6 (L)                                     AST (SGOT)                                                          0.0 - 39.0 U/L                           13.0                                        ALT(SGPT)                                                           0.0 - 52.0 U/L                           12.0                                        ALK PHOSPHATASE, BLD                                                34.0 - 104.0 U/L                         109.0 (H)                                   BILIRUBIN TOTAL                                                     0.3 - 1.3 MG/DL                          1.1                                         SODIUM-BLD                                                          135.0 - 145.0 MEQ/L                      142.0                                       POTASSIUM                                                           3.5 - 5.2 MEQ/L                          4.6                                         CHLORIDE-BLD                                                        98.0 - 109.0 MEQ/L                       105.0                                       CO2 TOTAL                                                           21.0 - 31.0 MEQ/L                        25.7                                        IRON BIND.CAP.(TIBC)                                                250 - 450 ug/dL                                                                      UIBC                                                                118 - 369 ug/dL                                                                      IRON, SERUM                                                         27 - 139 ug/dL                                                                       IRON SATURATION                                                     15 - 55 %                                                                            CEA                                                                 0.0 - 4.7 ng/mL                                                                      URIC ACID                                                           3.3 - 8.7 MG/DL                          5.9                                         PHOSPHORUS                                                          2.5 - 5.0 MG/DL                          3.7                                         LD(LDH)                                                             140.0 - 271.0 U/L                        281.0 (H)                                   CORTISOL, SERUM                                                     6.2 - 19.4 ug/dL                         3.4 (L)                                     TSH                                                                 0.450 - 4.500 uIU/mL                     0.057 (L)                                   MAGNESIUM                                                           1.6 - 2.3 mg/dL                          2.2                                         FERRITIN                                                            15 - 150 ng/mL                                                                       THYROXINE (T4)                                                      4.5 - 12.0 ug/dL                         6.0                                         RETICULOCYTE COUNT                                                  0.6 - 2.6 %                                                                          T4,FREE (DIRECT)                                                    0.82 - 1.77 ng/dL                                                                    TRIIODOTHYRONINE,FREE,SERUM                                         2.0 - 4.4 pg/mL                                                                      THYROID PEROXIDASE (TPO) AB                                         0 - 34 IU/mL                                                                         THYROGLOBULIN ANTIBODY                                              0.0 - 0.9 IU/mL                                                                      THYROTROPIN RECEPTOR AB, SERUM                                      0.00 - 1.75 IU/L                                                                         EKG: Aflutter with RVR.  150's, NSST changes.       Telemetry: Sinus rhythm.
INTERVAL HPI/OVERNIGHT EVENTS:  Patient S&E at bedside. No o/n events,   pt HR in 70s this am, no palpitations or any symptoms today   remains on IVF- labs reviewed and stable   TTE with nl EF      PAST MEDICAL & SURGICAL HISTORY:  No significant past surgical history          FAMILY HISTORY:  No pertinent family history in first degree relatives        VITAL SIGNS:  T(F): 97.7 (09-11-23 @ 08:42)  HR: 85 (09-11-23 @ 08:42)  BP: 118/45 (09-11-23 @ 08:42)  RR: 18 (09-11-23 @ 08:42)  SpO2: 94% (09-11-23 @ 08:42)  Wt(kg): --    PHYSICAL EXAM:    Constitutional: NAD  Eyes: EOMI,   Respiratory: CTA b/l  Cardiovascular: regular rate  Gastrointestinal: soft, NTND  Extremities: no c/c/e  Neurological: AAOx3      MEDICATIONS  (STANDING):  apixaban 2.5 milliGRAM(s) Oral two times a day  dextrose 5%. 1000 milliLiter(s) (75 mL/Hr) IV Continuous <Continuous>  digoxin     Tablet 125 MICROGram(s) Oral daily  diltiazem    milliGRAM(s) Oral daily  metoprolol succinate ER 37.5 milliGRAM(s) Oral daily    MEDICATIONS  (PRN):  acetaminophen     Tablet .. 650 milliGRAM(s) Oral once PRN Temp greater or equal to 38C (100.4F), Mild Pain (1 - 3)  aluminum hydroxide/magnesium hydroxide/simethicone Suspension 30 milliLiter(s) Oral every 4 hours PRN Dyspepsia  melatonin 3 milliGRAM(s) Oral at bedtime PRN Insomnia  metoprolol tartrate Injectable 2.5 milliGRAM(s) IV Push every 6 hours PRN HR greater than 125  ondansetron Injectable 4 milliGRAM(s) IV Push every 8 hours PRN Nausea and/or Vomiting      Allergies    No Known Allergies    Intolerances        LABS:                        8.9    9.06  )-----------( 207      ( 11 Sep 2023 06:40 )             27.3     09-11    142  |  113<H>  |  13  ----------------------------<  99  3.9   |  27  |  0.57    Ca    8.2<L>      11 Sep 2023 06:40  Phos  2.7     09-11  Mg     2.3     09-11        Urinalysis Basic - ( 11 Sep 2023 06:40 )    Color: x / Appearance: x / SG: x / pH: x  Gluc: 99 mg/dL / Ketone: x  / Bili: x / Urobili: x   Blood: x / Protein: x / Nitrite: x   Leuk Esterase: x / RBC: x / WBC x   Sq Epi: x / Non Sq Epi: x / Bacteria: x        RADIOLOGY & ADDITIONAL TESTS:  Studies reviewed.  
INTERVAL HPI/OVERNIGHT EVENTS:  Patient S&E at bedside. No o/n events, patient resting comfortably. overnight had recurrent episode of rapid aflutter  no cp     VITAL SIGNS:  T(F): 97.6 (09-10-23 @ 08:00)  HR: 90 (09-10-23 @ 11:04)  BP: 120/50 (09-10-23 @ 09:51)  RR: 18 (09-10-23 @ 08:00)  SpO2: 95% (09-10-23 @ 08:00)  Wt(kg): --    PHYSICAL EXAM:    Constitutional: NAD  Eyes: EOMI, sclera non-icteric  Neck: supple, no masses, no JVD  Respiratory: CTA b/l, good air entry b/l  Cardiovascular: RRR, no M/R/G  Gastrointestinal: soft, NTND, no masses palpable, + BS, no hepatosplenomegaly  Extremities: no c/c/e  Neurological: AAOx3      MEDICATIONS  (STANDING):  apixaban 2.5 milliGRAM(s) Oral two times a day  dextrose 5%. 1000 milliLiter(s) (75 mL/Hr) IV Continuous <Continuous>  digoxin     Tablet 125 MICROGram(s) Oral daily  diltiazem    milliGRAM(s) Oral daily  metoprolol succinate ER 37.5 milliGRAM(s) Oral daily  potassium phosphate / sodium phosphate Powder (PHOS-NaK) 1 Packet(s) Oral three times a day with meals    MEDICATIONS  (PRN):  acetaminophen     Tablet .. 650 milliGRAM(s) Oral once PRN Temp greater or equal to 38C (100.4F), Mild Pain (1 - 3)  aluminum hydroxide/magnesium hydroxide/simethicone Suspension 30 milliLiter(s) Oral every 4 hours PRN Dyspepsia  melatonin 3 milliGRAM(s) Oral at bedtime PRN Insomnia  metoprolol tartrate Injectable 2.5 milliGRAM(s) IV Push every 6 hours PRN HR greater than 125  ondansetron Injectable 4 milliGRAM(s) IV Push every 8 hours PRN Nausea and/or Vomiting      Allergies    No Known Allergies    Intolerances        LABS:                        8.7    8.31  )-----------( 151      ( 10 Sep 2023 06:46 )             26.5     09-10    148<H>  |  116<H>  |  12  ----------------------------<  93  3.7   |  28  |  0.54    Ca    8.5      10 Sep 2023 06:46  Phos  2.3     09-10  Mg     2.1     09-10    TPro  6.1  /  Alb  2.7<L>  /  TBili  0.5  /  DBili  x   /  AST  14<L>  /  ALT  17  /  AlkPhos  146<H>  09-08    PT/INR - ( 08 Sep 2023 14:11 )   PT: 11.5 sec;   INR: 1.02 ratio         PTT - ( 08 Sep 2023 14:11 )  PTT:26.3 sec  Urinalysis Basic - ( 10 Sep 2023 06:46 )    Color: x / Appearance: x / SG: x / pH: x  Gluc: 93 mg/dL / Ketone: x  / Bili: x / Urobili: x   Blood: x / Protein: x / Nitrite: x   Leuk Esterase: x / RBC: x / WBC x   Sq Epi: x / Non Sq Epi: x / Bacteria: x        RADIOLOGY & ADDITIONAL TESTS:  Studies reviewed.    ASSESSMENT & PLAN:

## 2023-09-11 NOTE — DISCHARGE NOTE PROVIDER - NSDCCPCAREPLAN_GEN_ALL_CORE_FT
PRINCIPAL DISCHARGE DIAGNOSIS  Diagnosis: Atrial flutter with rapid ventricular response  Assessment and Plan of Treatment: Abnormal Heart Rythym leading to more inefficient blood flow at higher heart rates. Medications adjusted to control heart rate. Take medications as prescribed. In addition, with atrial fibrillation, increased risk of developing clots in the heart which can travel to the rest of the body and cause trouble (example is stroke). In order to prevent blood clots, you are on a blood thinner that helps prevent blood clot formation. Take medication as prescribed.   Being on a blood thinner will make cuts bleeding quicker/easier/longer and can cause bruising. Maintain prolonged pressure on any bleeding cuts. Caution and avoid trauma as major and minor bleeding can be potentiated by being on a blood thinner. If you notice any prolonged/persistent bleeding, seek medical attention.   Your TSH (thyroid stimulating hormone) was low but your other thyroid labs were within normal limits. You could have a component of hyperthyroidism (overactive thyroid) however difficult to assess/confirm in the acute setting. Get repeat blood work with your primary medical doctor in 2-3 weeks to reassess your thyroid function.      SECONDARY DISCHARGE DIAGNOSES  Diagnosis: Lung cancer  Assessment and Plan of Treatment: Follow up as scheduled with your Hematologist/Oncologist.

## 2023-09-11 NOTE — CONSULT NOTE ADULT - SUBJECTIVE AND OBJECTIVE BOX
HPI:  78 y/o Female with PMHx colon polyps, RLL mass, tachycardia, right adrenal mass, recently diagnosed small cell lung cancer on carbo/etoposide/atezolizumab started on 6/26/23 and also received radiation for this who Came to [place] yesterday for chemo treatment and was found to be in rapid atrial flutter with marginal blood pressure.  BP in office was 90-95 systolic.  Sent to ER for evaluation/treatment and admission.  Denies chest pain, shortness of breath at rest, orthopnea, paroxysmal nocturnal dyspnea, claudication, pre-syncope or syncope. Denies palpitations or rapid heart beating.  No new complaints and BP better after IVF.  (09 Sep 2023 05:38)      PAST MEDICAL & SURGICAL HISTORY:  No significant past surgical history          Allergies    No Known Allergies    Intolerances        MEDICATIONS  (STANDING):  apixaban 2.5 milliGRAM(s) Oral two times a day  dextrose 5%. 1000 milliLiter(s) (75 mL/Hr) IV Continuous <Continuous>  digoxin     Tablet 125 MICROGram(s) Oral daily  diltiazem    milliGRAM(s) Oral daily  metoprolol succinate ER 37.5 milliGRAM(s) Oral daily    MEDICATIONS  (PRN):  acetaminophen     Tablet .. 650 milliGRAM(s) Oral once PRN Temp greater or equal to 38C (100.4F), Mild Pain (1 - 3)  aluminum hydroxide/magnesium hydroxide/simethicone Suspension 30 milliLiter(s) Oral every 4 hours PRN Dyspepsia  melatonin 3 milliGRAM(s) Oral at bedtime PRN Insomnia  metoprolol tartrate Injectable 2.5 milliGRAM(s) IV Push every 6 hours PRN HR greater than 125  ondansetron Injectable 4 milliGRAM(s) IV Push every 8 hours PRN Nausea and/or Vomiting      FAMILY HISTORY:  No pertinent family history in first degree relatives        SOCIAL HISTORY: No EtOH, no tobacco    REVIEW OF SYSTEMS:    CONSTITUTIONAL: No weakness, fevers or chills  EYES/ENT: No visual changes;  No vertigo or throat pain   NECK: No pain or stiffness  RESPIRATORY: No cough, wheezing, hemoptysis; No shortness of breath  CARDIOVASCULAR: No chest pain or palpitations  GASTROINTESTINAL: No abdominal or epigastric pain. No nausea, vomiting, or hematemesis; No diarrhea or constipation. No melena or hematochezia.  GENITOURINARY: No dysuria, frequency or hematuria  NEUROLOGICAL: No numbness or weakness  SKIN: No itching, burning, rashes, or lesions   All other review of systems is negative unless indicated above.        T(F): 97.7 (09-11-23 @ 08:42), Max: 98.6 (09-10-23 @ 20:23)  HR: 85 (09-11-23 @ 08:42)  BP: 118/45 (09-11-23 @ 08:42)  RR: 18 (09-11-23 @ 08:42)  SpO2: 94% (09-11-23 @ 08:42)  Wt(kg): --    GENERAL: NAD, well-developed  HEAD:  Atraumatic, Normocephalic  EYES: EOMI, PERRLA, conjunctiva and sclera clear  NECK: Supple, No JVD  CHEST/LUNG: Clear to auscultation bilaterally; No wheeze  HEART: Regular rate and rhythm; No murmurs, rubs, or gallops  ABDOMEN: Soft, Nontender, Nondistended; Bowel sounds present  EXTREMITIES:  2+ Peripheral Pulses, No clubbing, cyanosis, or edema  NEUROLOGY: non-focal  SKIN: No rashes or lesions                          8.9    9.06  )-----------( 207      ( 11 Sep 2023 06:40 )             27.3       09-11    142  |  113<H>  |  13  ----------------------------<  99  3.9   |  27  |  0.57    Ca    8.2<L>      11 Sep 2023 06:40  Phos  2.7     09-11  Mg     2.3     09-11        Magnesium: 2.3 mg/dL (09-11 @ 06:40)  Phosphorus: 2.7 mg/dL (09-11 @ 06:40)          
HPI:  76 y/o Female with PMHx colon polyps, RLL mass, tachycardia, right adrenal mass, recently diagnosed small cell lung cancer on carbo/etoposide/atezolizumab started on 6/26/23 and also received radiation for this who Came to [place] yesterday for chemo treatment and was found to be in rapid atrial flutter with marginal blood pressure.  BP in office was 90-95 systolic.  Sent to ER for evaluation/treatment and admission.  Denies chest pain, shortness of breath at rest, orthopnea, paroxysmal nocturnal dyspnea, claudication, pre-syncope or syncope. Denies palpitations or rapid heart beating.  No new complaints and BP better after IVF.  (09 Sep 2023 05:38)        PAST MEDICAL & SURGICAL HISTORY:  No significant past surgical history          Allergies    No Known Allergies    Intolerances        MEDICATIONS  (STANDING):  apixaban 2.5 milliGRAM(s) Oral two times a day  digoxin     Tablet 125 MICROGram(s) Oral daily  diltiazem    milliGRAM(s) Oral daily  metoprolol succinate ER 25 milliGRAM(s) Oral two times a day  potassium phosphate / sodium phosphate Powder (PHOS-NaK) 1 Packet(s) Oral three times a day with meals    MEDICATIONS  (PRN):  acetaminophen     Tablet .. 650 milliGRAM(s) Oral once PRN Temp greater or equal to 38C (100.4F), Mild Pain (1 - 3)  aluminum hydroxide/magnesium hydroxide/simethicone Suspension 30 milliLiter(s) Oral every 4 hours PRN Dyspepsia  melatonin 3 milliGRAM(s) Oral at bedtime PRN Insomnia  metoprolol tartrate Injectable 2.5 milliGRAM(s) IV Push every 6 hours PRN HR greater than 125  ondansetron Injectable 4 milliGRAM(s) IV Push every 8 hours PRN Nausea and/or Vomiting      FAMILY HISTORY:  No pertinent family history in first degree relatives        SOCIAL HISTORY: No EtOH, no tobacco    REVIEW OF SYSTEMS:    CONSTITUTIONAL: No weakness, fevers or chills  EYES/ENT: No visual changes;  No vertigo or throat pain   NECK: No pain or stiffness  RESPIRATORY: No cough, wheezing, hemoptysis; No shortness of breath  CARDIOVASCULAR: No chest pain or palpitations  GASTROINTESTINAL: No abdominal or epigastric pain. No nausea, vomiting, or hematemesis; No diarrhea or constipation. No melena or hematochezia.  GENITOURINARY: No dysuria, frequency or hematuria  NEUROLOGICAL: No numbness or weakness  SKIN: No itching, burning, rashes, or lesions   All other review of systems is negative unless indicated above.        T(F): 98.2 (09-09-23 @ 20:29), Max: 98.4 (09-09-23 @ 09:46)  HR: 80 (09-10-23 @ 06:04)  BP: 117/50 (09-10-23 @ 06:04)  RR: 18 (09-09-23 @ 20:29)  SpO2: 93% (09-09-23 @ 20:29)  Wt(kg): --    GENERAL: NAD, well-developed  HEAD:  Atraumatic, Normocephalic  EYES: EOMI, PERRLA, conjunctiva and sclera clear  NECK: Supple, No JVD  CHEST/LUNG: Clear to auscultation bilaterally; No wheeze  HEART: Regular rate and rhythm; No murmurs, rubs, or gallops  ABDOMEN: Soft, Nontender, Nondistended; Bowel sounds present  EXTREMITIES:  2+ Peripheral Pulses, No clubbing, cyanosis, or edema  NEUROLOGY: non-focal  SKIN: No rashes or lesions                          8.7    8.31  )-----------( 151      ( 10 Sep 2023 06:46 )             26.5       09-09    145  |  114<H>  |  11  ----------------------------<  164<H>  3.4<L>   |  25  |  0.67    Ca    8.3<L>      09 Sep 2023 10:24  Phos  2.0     09-09  Mg     2.0     09-09    TPro  6.1  /  Alb  2.7<L>  /  TBili  0.5  /  DBili  x   /  AST  14<L>  /  ALT  17  /  AlkPhos  146<H>  09-08      Magnesium: 2.0 mg/dL (09-09 @ 10:24)  Phosphorus: 2.0 mg/dL (09-09 @ 10:24)      PT/INR - ( 08 Sep 2023 14:11 )   PT: 11.5 sec;   INR: 1.02 ratio         PTT - ( 08 Sep 2023 14:11 )  PTT:26.3 sec    
76 y/o F with PMHx colon polyps, RLL mass, tachycardia, right adrenal mass, recently diagnosed small cell lung cancer on carbo/etoposide/atezolizumab started on 6/26/23 and also received radiation for this who Came to [place] today for chemo treatment and was found to be in rapid atrial flutter with marginal blood pressure.  Bp in office 90-95 systolic.  Sent to ER for evaluation/treatment and admission.  Denies chest pain, shortness of breath at rest, orthopnea, paroxysmal nocturnal dyspnea, claudication, pre-syncope or syncope. Denies palpitations or rapid heart beating.  Recent echo: 5/17/23 EF 55-60%, trivial pericardial effusion present. Now back in NSR in ER after IV diltizem, and digoxin. No new complaints and BP better after IVF. Oncologist is Dr. Domingo.  Endo: Dr. Clay.        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 LN station 4R and 11R atypical cells present, findings unable to completely exclude possibility of underlying small cell  carcinoma. BAL suspicious for malignant cells. Pancytokeratin (Positive), CD56 (Pos), INSMI (focally pos), Synaptophysin  (Pos), Thyroid transcription factor 1 (Pos), Ki67 (50%)   Study supports diagnosis of Small Cell Carcinoma      Past medical History  Cdiff, colon polyps, RLL mas, tachycardia, right adrenal mass    Past Surgical History  pshxappendectomy,  eye surgery, ovarian cyst surgery, sinus excision, tonsillectomy  Hyperthyroidism (no treatment as she is asymptomatic)      Social History  Patient is .  Patient lives with son/daughter.  Past tobacco smoker Not applicable Patient reports alcohol use as follows:  Denies any illicit drug use.  Patient has had no occupational exposure.    Family History  fhxfather  colon cancer      Personal History  Patient reports having routine colonoscopy. Last done:[id number]  Patient denies having ever received blood products in the past.    Allergies  NKDA    Home Medications:  Decadron  Lopressor    MEDICATIONS  (STANDING):  diltiazem    Tablet 30 milliGRAM(s) Oral once  Digoxin 0.25 IVP x1    MEDICATIONS  (PRN):  acetaminophen     Tablet .. 650 milliGRAM(s) Oral once PRN Temp greater or equal to 38C (100.4F), Mild Pain (1 - 3)      Vital Signs Last 24 Hrs  T(C): 36.9 (08 Sep 2023 19:46), Max: 37.2 (08 Sep 2023 14:11)  T(F): 98.5 (08 Sep 2023 19:46), Max: 98.9 (08 Sep 2023 14:11)  HR: 80 (08 Sep 2023 19:46) (80 - 163)  BP: 117/64 (08 Sep 2023 19:46) (105/68 - 117/64)  BP(mean): 81 (08 Sep 2023 14:11) (81 - 81)  RR: 18 (08 Sep 2023 19:46) (18 - 29)  SpO2: 99% (08 Sep 2023 19:46) (98% - 99%)    Parameters below as of 08 Sep 2023 14:11  Patient On (Oxygen Delivery Method): room air    I&O's Detail    Physical Exam  NAD no acute distress. Not anxious.  No rash noted. No lesions. PT with raynauds  Sclera clear, no jaundice noted.  Regular Rate and Rhythm S1, S2 , 1/6 RAFFI.  LUSB, No S3, No S4.    No dyspnea. Unlabored breathing. No accessory muscle use. No rhonchi. No wheezing.  Abdomen is soft and nontender to touch. No abdominal pain or guarding noted.  No petechiae noted. Purpura noted during exam. No lymphadenopathy noted.  Affect normal. No tremors observed.  Oriented to person/place/time. No anxiety observed during exam. No signs of depression noted during exam.  No calf tenderness reported by patient. No edema.                              9.7    4.18  )-----------( 93       ( 08 Sep 2023 14:11 )             29.0     09-08    141  |  110<H>  |  15  ----------------------------<  125<H>  3.5   |  27  |  0.59    Ca    8.3<L>      08 Sep 2023 14:11  Mg     2.1     09-08    TPro  6.1  /  Alb  2.7<L>  /  TBili  0.5  /  DBili  x   /  AST  14<L>  /  ALT  17  /  AlkPhos  146<H>  09-08        - Troponin: <-17.37  LIVER FUNCTIONS - ( 08 Sep 2023 14:11 )  Alb: 2.7 g/dL / Pro: 6.1 gm/dL / ALK PHOS: 146 U/L / ALT: 17 U/L / AST: 14 U/L / GGT: x           PT/INR - ( 08 Sep 2023 14:11 )   PT: 11.5 sec;   INR: 1.02 ratio         PTT - ( 08 Sep 2023 14:11 )  PTT:26.3 sec    Pro-Brain Natriuretic Peptide: 2510 pg/mL (09.08.23 @ 14:11)    OFFICE LAB WORK      Component                                                           Latest Ref Rng & Units                   8/28/2023 9/8/2023              WHITE BLOOD CELL                                                    3.8 - 10.5 X10                           26.8 (HH)             3.0 (L)               RBC BLOOD CELL                                                      3.69 - 5.20 X10                          4.22                  2.97 (L)              Hemoglobin                                                          11.0 - 14.8 G/DL                         12.1                  8.5 (L)               HEMATOCRIT                                                          33.0 - 44.4 %                            37.4                  27.1 (L)              MEAN LIBRA. VOLUME                                                   80.0 - 98.0 FL                           88.0                  92.0                  MEAN LIBRA. HGB                                                      27.0 - 33.0 PG                           28.6                  28.7                  MEAN LIBRA. HGB CONC.                                                32.0 - 36.0 G/DL                         32.4                  31.3 (L)              RED DISTRIB. WIDTH                                                  10.5 - 14.3 %                            21.6 (H)              20.2 (H)              PLATELET                                                            140.0 - 375.0 X10                        200.0                 80.0 (L)                         GLUCOSE                                                             70.0 - 99.0 MG/DL                        117.0 (H)                                   UREA NITROGEN                                                       7.0 - 25.0 MG/DL                         17.7                                        Creatinine                                                          0.6 - 1.3 MG/DL                          0.6                                         EGFR NON- AM                                                 ML/MIN/1.7                               96.69                                       EGFR  AM                                                     ML/MIN/1.7                               96.69                                       CALCIUM                                                             8.6 - 10.3 MG/DL                         8.7                                         Protein Total, Serum                                                6.0 - 8.3 G/DL                           5.7 (L)                                     ALBUMIN, BLD                                                        3.7 - 5.3 G/DL                           3.6 (L)                                     AST (SGOT)                                                          0.0 - 39.0 U/L                           13.0                                        ALT(SGPT)                                                           0.0 - 52.0 U/L                           12.0                                        ALK PHOSPHATASE, BLD                                                34.0 - 104.0 U/L                         109.0 (H)                                   BILIRUBIN TOTAL                                                     0.3 - 1.3 MG/DL                          1.1                                         SODIUM-BLD                                                          135.0 - 145.0 MEQ/L                      142.0                                       POTASSIUM                                                           3.5 - 5.2 MEQ/L                          4.6                                         CHLORIDE-BLD                                                        98.0 - 109.0 MEQ/L                       105.0                                       CO2 TOTAL                                                           21.0 - 31.0 MEQ/L                        25.7                                        IRON BIND.CAP.(TIBC)                                                250 - 450 ug/dL                                                                      UIBC                                                                118 - 369 ug/dL                                                                      IRON, SERUM                                                         27 - 139 ug/dL                                                                       IRON SATURATION                                                     15 - 55 %                                                                            CEA                                                                 0.0 - 4.7 ng/mL                                                                      URIC ACID                                                           3.3 - 8.7 MG/DL                          5.9                                         PHOSPHORUS                                                          2.5 - 5.0 MG/DL                          3.7                                         LD(LDH)                                                             140.0 - 271.0 U/L                        281.0 (H)                                   CORTISOL, SERUM                                                     6.2 - 19.4 ug/dL                         3.4 (L)                                     TSH                                                                 0.450 - 4.500 uIU/mL                     0.057 (L)                                   MAGNESIUM                                                           1.6 - 2.3 mg/dL                          2.2                                         FERRITIN                                                            15 - 150 ng/mL                                                                       THYROXINE (T4)                                                      4.5 - 12.0 ug/dL                         6.0                                         RETICULOCYTE COUNT                                                  0.6 - 2.6 %                                                                          T4,FREE (DIRECT)                                                    0.82 - 1.77 ng/dL                                                                    TRIIODOTHYRONINE,FREE,SERUM                                         2.0 - 4.4 pg/mL                                                                      THYROID PEROXIDASE (TPO) AB                                         0 - 34 IU/mL                                                                         THYROGLOBULIN ANTIBODY                                              0.0 - 0.9 IU/mL                                                                      THYROTROPIN RECEPTOR AB, SERUM                                      0.00 - 1.75 IU/L                                                                         EKG: Aflutter with RVR.  150's, NSST changes.

## 2023-09-11 NOTE — DISCHARGE NOTE PROVIDER - HOSPITAL COURSE
FROM H&P:    "78 y/o Female with PMHx colon polyps, RLL mass, tachycardia, right adrenal mass, recently diagnosed small cell lung cancer on carbo/etoposide/atezolizumab started on 6/26/23 and also received radiation for this who Came to [place] yesterday for chemo treatment and was found to be in rapid atrial flutter with marginal blood pressure.  BP in office was 90-95 systolic.  Sent to ER for evaluation/treatment and admission.  Denies chest pain, shortness of breath at rest, orthopnea, paroxysmal nocturnal dyspnea, claudication, pre-syncope or syncope. Denies palpitations or rapid heart beating.  No new complaints and BP better after IVF. "    9/9: No complaints. Denies chest pain, dizziness, palpiations, nausea, vomiting, SOB. Afib RVR in am to 170s. Asytmpomatic. ->increaesd BB->back to NSR. Cardizem IR changed to CD    9/10: AFib RVR in [facility]. No symptoms except for maybe transient palpitations. Increased Cardizem and Toprol->back in sinus in the afternoon. Continue to monitor.     New onset Paroxysmal A flutter with Rapid ventricular response  -started on PO cardizem and Digoxin and BB.   -Back in sinus 9/9->back in RVR 9/10->back in sinu afternoon of 9/10  -started on Eliquis  -TTE (9/9): EF 60-65% with Mild concentric LVH, Mild LA dilation, Mild AS/MR/TR  -Cardiology consult/recs->discussed with cardiology. If remains rate controlled and asymptomatic, tentative discharge 9/10  -Possible subclinical hyperthyroidism. TSH suppressed. Normal T3/T4. IN NSR and rate controlled now. Continue BB. outpatient follow up with PMD for recheck of TFTs in 1-3 weeks for continued evaluation    Metastatic Small Cell Lung Ca on Chemo  Assocaited anemia/thrombocytopenia  -Plt/Hgb relatively stable. no overt signs of bleeding.continue to monitor. outpatient follow up     Back in sinus rythym with titration of HR medication. Asymptomatic. Cleared by hem/onc and cardiology. Discharge home in stable condition and close outpatient follow up.   T(C): 36.5 (09-11-23 @ 08:42), Max: 37 (09-10-23 @ 20:23)  HR: 85 (09-11-23 @ 08:42) (79 - 85)  BP: 118/45 (09-11-23 @ 08:42) (118/45 - 122/42)  RR: 18 (09-11-23 @ 08:42) (18 - 18)  SpO2: 94% (09-11-23 @ 08:42) (94% - 94%)  General: AAOx3; NAD  Neck: Non-tender; No JVD  CVS: RRR, S1&S2, No murmur, No edema  Respiratory: Lungs CTA B/L; Normal Respiratory Effort  Abdomen/GI: Soft, non-tender, non-distended, no guarding, no rebound  Neuro: AAOx3, CNII-XII grossly intact, non-focal  Discharge Management: 37 minutes  Date of Discharge/Service: 9/11/2023

## 2023-09-11 NOTE — DISCHARGE NOTE NURSING/CASE MANAGEMENT/SOCIAL WORK - PATIENT PORTAL LINK FT
You can access the FollowMyHealth Patient Portal offered by [facility] by registering at the following website: [email or web address]. By joining [facility]’s FollowMyHealth portal, you will also be able to view your health information using other applications (apps) compatible with our system.

## 2023-09-18 DIAGNOSIS — E05.80 OTHER THYROTOXICOSIS WITHOUT THYROTOXIC CRISIS OR STORM: ICD-10-CM

## 2023-09-18 DIAGNOSIS — Z90.49 ACQUIRED ABSENCE OF OTHER SPECIFIED PARTS OF DIGESTIVE TRACT: ICD-10-CM

## 2023-09-18 DIAGNOSIS — Z79.899 OTHER LONG TERM (CURRENT) DRUG THERAPY: ICD-10-CM

## 2023-09-18 DIAGNOSIS — C79.31 SECONDARY MALIGNANT NEOPLASM OF BRAIN: ICD-10-CM

## 2023-09-18 DIAGNOSIS — C34.90 MALIGNANT NEOPLASM OF UNSPECIFIED PART OF UNSPECIFIED BRONCHUS OR LUNG: ICD-10-CM

## 2023-09-18 DIAGNOSIS — Z87.891 PERSONAL HISTORY OF NICOTINE DEPENDENCE: ICD-10-CM

## 2023-09-18 DIAGNOSIS — D63.0 ANEMIA IN NEOPLASTIC DISEASE: ICD-10-CM

## 2023-09-18 DIAGNOSIS — I48.0 PAROXYSMAL ATRIAL FIBRILLATION: ICD-10-CM

## 2023-09-18 DIAGNOSIS — I48.92 UNSPECIFIED ATRIAL FLUTTER: ICD-10-CM

## 2023-09-18 DIAGNOSIS — D69.59 OTHER SECONDARY THROMBOCYTOPENIA: ICD-10-CM
